# Patient Record
Sex: FEMALE | Race: BLACK OR AFRICAN AMERICAN | Employment: UNEMPLOYED | URBAN - METROPOLITAN AREA
[De-identification: names, ages, dates, MRNs, and addresses within clinical notes are randomized per-mention and may not be internally consistent; named-entity substitution may affect disease eponyms.]

---

## 2017-01-12 ENCOUNTER — HOSPITAL ENCOUNTER (OUTPATIENT)
Dept: GENERAL RADIOLOGY | Age: 42
Discharge: HOME | End: 2017-01-12
Attending: NURSE PRACTITIONER
Payer: MEDICARE

## 2017-01-12 ENCOUNTER — OFFICE VISIT (OUTPATIENT)
Dept: SURGERY | Age: 42
End: 2017-01-12

## 2017-01-12 VITALS
HEART RATE: 100 BPM | TEMPERATURE: 98.4 F | SYSTOLIC BLOOD PRESSURE: 120 MMHG | BODY MASS INDEX: 32.86 KG/M2 | WEIGHT: 222.5 LBS | RESPIRATION RATE: 20 BRPM | DIASTOLIC BLOOD PRESSURE: 80 MMHG

## 2017-01-12 DIAGNOSIS — K29.60 REFLUX GASTRITIS: ICD-10-CM

## 2017-01-12 DIAGNOSIS — E66.01 MORBID OBESITY DUE TO EXCESS CALORIES (HCC): Primary | ICD-10-CM

## 2017-01-12 DIAGNOSIS — Z98.84 LAP-BAND SURGERY STATUS: ICD-10-CM

## 2017-01-12 DIAGNOSIS — E66.01 MORBID OBESITY DUE TO EXCESS CALORIES (HCC): ICD-10-CM

## 2017-01-12 DIAGNOSIS — R13.10 DYSPHAGIA, UNSPECIFIED TYPE: ICD-10-CM

## 2017-01-12 DIAGNOSIS — G43.A0 CYCLICAL VOMITING WITHOUT NAUSEA, INTRACTABILITY OF VOMITING NOT SPECIFIED: ICD-10-CM

## 2017-01-12 DIAGNOSIS — Z46.51 ENCOUNTER FOR ADJUSTMENT OF GASTRIC LAP BAND: ICD-10-CM

## 2017-01-12 PROBLEM — Z94.0 S/P KIDNEY TRANSPLANT: Status: ACTIVE | Noted: 2017-01-12

## 2017-01-12 PROCEDURE — 74241 XR UPPER GI SERIES W KUB: CPT

## 2017-01-12 RX ORDER — HYDROXYZINE 25 MG/1
TABLET, FILM COATED ORAL
COMMUNITY

## 2017-01-12 RX ORDER — LAMOTRIGINE 25 MG/1
TABLET ORAL DAILY
COMMUNITY

## 2017-01-12 NOTE — MR AVS SNAPSHOT
Visit Information Date & Time Provider Department Dept. Phone Encounter #  
 1/12/2017  9:20 AM Henry Alvarez NP [facility] 22 [phone] [id number] Follow-up Instructions Return in about 2 weeks (around 1/26/2017). Your Appointments 1/26/2017  9:40 AM  
ESTABLISHED PATIENT with Henry Alvarez NP  
[facility] 22 [phone] ([address]) Appt Note: EP 2 week F/U $0CP $0PB  
 [address] [facility] [address]  
[address] [place] [address] Upcoming Health Maintenance Date Due HEMOGLOBIN A1C Q6M 1977 FOOT EXAM Q1 8/18/1987 MICROALBUMIN Q1 8/18/1987 EYE EXAM RETINAL OR DILATED Q1 8/18/1987 Pneumococcal 19-64 Medium Risk (1 of 1 - PPSV23) 8/18/1996 DTaP/Tdap/Td series (1 - Tdap) 8/18/1998 PAP AKA CERVICAL CYTOLOGY 8/18/1998 LIPID PANEL Q1 4/24/2010 INFLUENZA AGE 9 TO ADULT 8/1/2016 Allergies as of 1/12/2017  Review Complete On: 1/12/2017 By: Henry Alvarez NP Severity Noted Reaction Type Reactions Percocet [Oxycodone-acetaminophen] High 09/30/2010   Systemic Nausea and Vomiting Food Enzyme [Alpha-d-galactosidase] Medium 10/11/2010   Topical Itching Tomatoes itching,citrus itching Morphine Medium 09/30/2010   Topical Itching Current Immunizations  Never Reviewed No immunizations on file. Not reviewed this visit You Were Diagnosed With   
  
 Codes Comments Morbid obesity due to excess calories ([facility] [address]    -  Primary ICD-10-CM: E66.01 
ICD-9-CM: 278.01   
 LAP-BAND surgery status     ICD-10-CM: Z98.84 ICD-9-CM: V45.86 Encounter for adjustment of gastric lap band     ICD-10-CM: Z46.51 
ICD-9-CM: V53.51 Cyclical vomiting without nausea, intractability of vomiting not specified     ICD-10-CM: G43. A0 ICD-9-CM: 536.2 Reflux gastritis     ICD-10-CM: K29.60 ICD-9-CM: 535.40 Dysphagia, unspecified type     ICD-10-CM: R13.10 ICD-9-CM: 787.20 BMI 32.0-32.9,adult     ICD-10-CM: Q00.35 
ICD-9-CM: V85.32 Vitals BP Pulse Temp Resp Weight(growth percentile) LMP  
 120/80 (BP 1 Location: Right arm, BP Patient Position: Sitting) 100 98.4 °F (36.9 °C) 20 222 lb 8 oz (100.9 kg) 04/07/2013 BMI OB Status Smoking Status 32.86 kg/m2 Hysterectomy Never Smoker Vitals History BMI and BSA Data Body Mass Index Body Surface Area  
 32.86 kg/m 2 2.22 m 2 Preferred Pharmacy Pharmacy Name Phone [address] [phone] Your Updated Medication List  
  
   
This list is accurate as of: 1/12/17 12:35 PM.  Always use your most recent med list.  
  
  
  
  
 aspirin 325 mg tablet Commonly known as:  ASPIRIN Take 325 mg by mouth daily. bumetanide 0.5 mg tablet Commonly known as:  Allegra Peaches Take 2 mg by mouth daily. CELLCEPT 500 mg tablet Generic drug:  mycophenolate Take 500 mg by mouth two (2) times a day. citalopram 20 mg tablet Commonly known as:  Bolivar Peel Take  by mouth daily. FLEXERIL 10 mg tablet Generic drug:  cyclobenzaprine Take 10 mg by mouth three (3) times daily as needed. HYDROmorphone 2 mg tablet Commonly known as:  DILAUDID Take 1 Tab by mouth every four (4) hours as needed for Pain. Max Daily Amount: 12 mg.  
  
 hydrOXYzine HCl 25 mg tablet Commonly known as:  ATARAX Take  by mouth three (3) times daily as needed for Itching. KLOR-CON 10 10 mEq tablet Generic drug:  potassium chloride SR Take 20 mEq by mouth two (2) times a day. lamoTRIgine 25 mg tablet Commonly known as: LaMICtal  
Take  by mouth daily. LASIX PO Take  by mouth. LORazepam 2 mg tablet Commonly known as:  ATIVAN Take  by mouth every six (6) hours as needed. Magnesium Oxide 500 mg Cap Take  by mouth.  
  
 midodrine 2.5 mg tablet Commonly known as:  Elyn Pata Take 1 Tab by mouth three (3) times daily (with meals). PEPCID 20 mg tablet Generic drug:  famotidine Take 20 mg by mouth two (2) times a day. predniSONE 5 mg tablet Commonly known as:  Hyde Fossa Take  by mouth. PROGRAF 0.5 mg capsule Generic drug:  tacrolimus Take  by mouth two (2) times a day. SELSEB EX  
by Apply Externally route. We Performed the Following ADJUSTMENT GASTRIC BAND [ [facility]] Follow-up Instructions Return in about 2 weeks (around 2017). To-Do List   
 2017 Imaging:  XR UPPER GI SERIES W KUB   
  
 2017  1:30 PM  
  Appointment with Steffany Hernandez MD; [facility] XR OP 2 at [facility] RAD [address] [facility] ([phone]) **Exams including a Small Bowel series may take up to 4 hours. Patient needs to register 30 minutes prior to exam.  1.  Adults:  Nothing to eat or drink after midnight. 2.   to 6 months:  Nothing to eat or drink for 3 hours prior to the study. 3.  6 months to 1 year:  Nothing to eat or drink for 4 hours prior to the study. 4.  1 year to 4 years:  Nothing to eat or drink after midnight except for routine medications, if early morning study.  Otherwise, nothing to eat or drink 4 hours prior to study. 5.  5 years to 12 years:  Nothing to eat or drink after midnight except for routine medications, if early morning study.  Otherwise, nothing to eat or drink 6 hours prior to study.  6.  13 years to 18 years:  Nothing to eat or drink after midnight except for routing medications, if early morning study.  Otherwise, nothing to eat or drink 8 hours prior to study.  7.  You must bring a LIST or BAG of all current medication you are taking with you to your appointment. Patient Instructions Nausea and Vomiting: Care Instructions Your Care Instructions When you are nauseated, you may feel weak and sweaty and notice a lot of saliva in your mouth. Nausea often leads to vomiting. Most of the time you do not need to worry about nausea and vomiting, but they can be signs of other illnesses. Two common causes of nausea and vomiting are stomach flu and food poisoning. Nausea and vomiting from viral stomach flu will usually start to improve within 24 hours. Nausea and vomiting from food poisoning may last from 12 to 48 hours. The doctor has checked you carefully, but problems can develop later. If you notice any problems or new symptoms, get medical treatment right away. Follow-up care is a key part of your treatment and safety. Be sure to make and go to all appointments, and call your doctor if you are having problems. It's also a good idea to know your test results and keep a list of the medicines you take. How can you care for yourself at home? · To prevent dehydration, drink plenty of fluids, enough so that your urine is light yellow or clear like water. Choose water and other caffeine-free clear liquids until you feel better. If you have kidney, heart, or liver disease and have to limit fluids, talk with your doctor before you increase the amount of fluids you drink. · Rest in bed until you feel better. · When you are able to eat, try clear soups, mild foods, and liquids until all symptoms are gone for 12 to 48 hours. Other good choices include dry toast, crackers, cooked cereal, and gelatin dessert, such as Jell-O. When should you call for help? Call 911 anytime you think you may need emergency care. For example, call if: 
· You passed out (lost consciousness). Call your doctor now or seek immediate medical care if: 
· You have symptoms of dehydration, such as: ¨ Dry eyes and a dry mouth. ¨ Passing only a little dark urine. ¨ Feeling thirstier than usual. 
· You have new or worsening belly pain. · You have a new or higher fever. · You vomit blood or what looks like coffee grounds. Watch closely for changes in your health, and be sure to contact your doctor if: 
· You have ongoing nausea and vomiting. · Your vomiting is getting worse. · Your vomiting lasts longer than 2 days. · You are not getting better as expected. Where can you learn more? Go to http://reva-joni.info/. Enter 25 [id number] in the search box to learn more about \"Nausea and Vomiting: Care Instructions. \" Current as of: May 27, 2016 Content Version: 11.1 © 6530-0290 [employer]. Care instructions adapted under license by [employer] (which disclaims liability or warranty for this information). If you have questions about a medical condition or this instruction, always ask your healthcare professional. [address] any warranty or liability for your use of this information. Introducing [facility]! Vianey Dominguez introduces LaunchTrack patient portal. Now you can access parts of your medical record, email your doctor's office, and request medication refills online. 1. In your internet browser, go to [email or web address] 2. Click on the First Time User? Click Here link in the Sign In box. You will see the New Member Sign Up page. 3. Enter your LaunchTrack Access Code exactly as it appears below. You will not need to use this code after youve completed the sign-up process. If you do not sign up before the expiration date, you must request a new code. · LaunchTrack Access Code: [email or web address] Expires: 4/12/2017  9:44 AM 
 
4. Enter the last four digits of your Social Security Number (xxxx) and Date of Birth (mm/dd/yyyy) as indicated and click Submit. You will be taken to the next sign-up page. 5. Create a LaunchTrack ID. This will be your LaunchTrack login ID and cannot be changed, so think of one that is secure and easy to remember. 6. Create a [employer] password. You can change your password at any time. 7. Enter your Password Reset Question and Answer. This can be used at a later time if you forget your password. 8. Enter your e-mail address. You will receive e-mail notification when new information is available in [address] 9. Click Sign Up. You can now view and download portions of your medical record. 10. Click the Download Summary menu link to download a portable copy of your medical information. If you have questions, please visit the Frequently Asked Questions section of the [employer] website. Remember, [employer] is NOT to be used for urgent needs. For medical emergencies, dial 911. Now available from your iPhone and Android! Please provide this summary of care documentation to your next provider. Your primary care clinician is listed as [address]. If you have any questions after today's visit, please call [phone].

## 2017-01-12 NOTE — PROGRESS NOTES
1. Have you been to the ER, urgent care clinic since your last visit? Hospitalized since your last visit? No    2. Have you seen or consulted any other health care providers outside of the [address] [place] [address] since your last visit? Include any pap smears or colon screening.  No

## 2017-01-12 NOTE — PROGRESS NOTES
Jon Barker is a 46 y.o. female 7 years s/p lap adjustable gastric band down 55.5 pounds. Weight today is 222.5 pounds. Patient comes in office with complaint of 4 day history of dysphagia, reflux and vomiting. Stated it started after drinking apple juice a few days. She thinks she drunk it too fast. Stated she felt like it took hours for juice to go down. Stated stayed on liquid diet for a day, then start back with soups. Stated was vomiting with soup. Stated having night time reflux. Unable to keep medications down. She has anti-rejection medications that she needs status post kidney transplant. Found out yesterday can't even tolerate sips of clears. No nausea, no night-time cough, no fever, no chills, No shortness of breath, no chest pain, no abdominal pain. No issues with urination or bowel habits. HPI    Review of Systems   Constitutional: Positive for malaise/fatigue. Negative for chills and fever. Respiratory: Negative for cough, sputum production and shortness of breath. Cardiovascular: Negative for chest pain, palpitations and leg swelling. Gastrointestinal: Positive for vomiting. Negative for abdominal pain, constipation, diarrhea and nausea. Genitourinary: Negative for dysuria. Neurological: Negative for dizziness, weakness and headaches. Physical Exam   Constitutional: She is oriented to person, place, and time. She appears well-developed. She appears ill. Abdominal: Soft. Bowel sounds are normal. She exhibits no distension. There is no tenderness. There is no rebound and no guarding. Lap sites healed. Port is palpable. Musculoskeletal: Normal range of motion. Neurological: She is alert and oriented to person, place, and time. Skin: Skin is warm and dry. No erythema. Psychiatric: She has a normal mood and affect. Her behavior is normal. Thought content normal.   Blood pressure 120/80, pulse 100, temperature 98.4 °F (36.9 °C), resp.  rate 20, weight 222 lb 8 oz (100.9 kg), last menstrual period 04/07/2013. ASSESSMENT and PLAN  Morbid Obesity  7 years s/p lap adjustable gastric band down 55.5 pounds. Weight today is 222.5 pounds. Dysphagia, vomiting, reflux. Patient sent for Upper GI to evaluate lap band for slippage, pouch dilatation, etc. Upper GI results: Examination of the esophagus reveals significant stasis of barium during antegrade swallowing . There is slow antegrade passage of barium across the lower esophageal sphincter into the gastric lumen. In the semi erect position, there is more significant esophageal stasis, with regurgitation and inability to orally ingest further barium. Discussed and showed films with patient, advised she has beginning of lap band slippage with pouch dilatation, and some partial obstruction. Recommend to have all fluid removed from lap band for to see if symptoms will resolve. Emergent un-fill of lap band performed. Informed consent was obtained. Patient was placed in the standingposition. The abdomen was prepped using sterile technique. The port was then accessed with ease. 7.5 ml was removed from the band. atient has less than 1 ml of saline in band. Patient experienced dry heaves and mild regurgiation after fluid removed. She then was able to tolerate 4 ounces of water without difficulty. Patient was instructed in full liquid diet for the next 2-3 days then increase diet as tolerated. Patient to have repeat Upper GI for evaluation for resolution of early band slippage and pouch dilatation. Patient to follow up in 2 weeks. Patient was advised to notify office if experience dysphagia, nausea/vomiting, reflux despite removal of fluid from band. Patient verbalized understanding and questions were answered to the best of my knowledge and ability. Advised if any questions or concerns to call the office. 46 minutes was spent with patient.

## 2017-01-12 NOTE — PATIENT INSTRUCTIONS
Nausea and Vomiting: Care Instructions  Your Care Instructions    When you are nauseated, you may feel weak and sweaty and notice a lot of saliva in your mouth. Nausea often leads to vomiting. Most of the time you do not need to worry about nausea and vomiting, but they can be signs of other illnesses. Two common causes of nausea and vomiting are stomach flu and food poisoning. Nausea and vomiting from viral stomach flu will usually start to improve within 24 hours. Nausea and vomiting from food poisoning may last from 12 to 48 hours. The doctor has checked you carefully, but problems can develop later. If you notice any problems or new symptoms, get medical treatment right away. Follow-up care is a key part of your treatment and safety. Be sure to make and go to all appointments, and call your doctor if you are having problems. It's also a good idea to know your test results and keep a list of the medicines you take. How can you care for yourself at home? · To prevent dehydration, drink plenty of fluids, enough so that your urine is light yellow or clear like water. Choose water and other caffeine-free clear liquids until you feel better. If you have kidney, heart, or liver disease and have to limit fluids, talk with your doctor before you increase the amount of fluids you drink. · Rest in bed until you feel better. · When you are able to eat, try clear soups, mild foods, and liquids until all symptoms are gone for 12 to 48 hours. Other good choices include dry toast, crackers, cooked cereal, and gelatin dessert, such as Jell-O. When should you call for help? Call 911 anytime you think you may need emergency care. For example, call if:  · You passed out (lost consciousness). Call your doctor now or seek immediate medical care if:  · You have symptoms of dehydration, such as:  ¨ Dry eyes and a dry mouth. ¨ Passing only a little dark urine.   ¨ Feeling thirstier than usual.  · You have new or worsening belly pain. · You have a new or higher fever. · You vomit blood or what looks like coffee grounds. Watch closely for changes in your health, and be sure to contact your doctor if:  · You have ongoing nausea and vomiting. · Your vomiting is getting worse. · Your vomiting lasts longer than 2 days. · You are not getting better as expected. Where can you learn more? Go to http://reva-joni.info/. Enter 25 [id number] in the search box to learn more about \"Nausea and Vomiting: Care Instructions. \"  Current as of: May 27, 2016  Content Version: 11.1  © 6557-1328 [employer]. Care instructions adapted under license by [employer] (which disclaims liability or warranty for this information). If you have questions about a medical condition or this instruction, always ask your healthcare professional. [address] any warranty or liability for your use of this information.

## 2017-01-12 NOTE — Clinical Note
Will need second Upper GI and office appointment the same day. Please make appointment for both in 2 weeks.

## 2017-01-26 ENCOUNTER — OFFICE VISIT (OUTPATIENT)
Dept: SURGERY | Age: 42
End: 2017-01-26

## 2017-01-26 ENCOUNTER — HOSPITAL ENCOUNTER (OUTPATIENT)
Dept: GENERAL RADIOLOGY | Age: 42
Discharge: HOME | End: 2017-01-26
Attending: NURSE PRACTITIONER
Payer: MEDICARE

## 2017-01-26 VITALS
RESPIRATION RATE: 20 BRPM | TEMPERATURE: 97.6 F | DIASTOLIC BLOOD PRESSURE: 70 MMHG | WEIGHT: 218 LBS | HEART RATE: 96 BPM | SYSTOLIC BLOOD PRESSURE: 116 MMHG | HEIGHT: 69 IN | BODY MASS INDEX: 32.29 KG/M2

## 2017-01-26 DIAGNOSIS — R13.10 DYSPHAGIA, UNSPECIFIED TYPE: ICD-10-CM

## 2017-01-26 DIAGNOSIS — G43.A0 CYCLICAL VOMITING WITHOUT NAUSEA, INTRACTABILITY OF VOMITING NOT SPECIFIED: ICD-10-CM

## 2017-01-26 DIAGNOSIS — Z98.84 LAP-BAND SURGERY STATUS: ICD-10-CM

## 2017-01-26 DIAGNOSIS — K21.00 REFLUX ESOPHAGITIS: ICD-10-CM

## 2017-01-26 DIAGNOSIS — E66.01 MORBID OBESITY DUE TO EXCESS CALORIES (HCC): ICD-10-CM

## 2017-01-26 DIAGNOSIS — R10.13 DYSPEPSIA: ICD-10-CM

## 2017-01-26 DIAGNOSIS — K29.60 REFLUX GASTRITIS: ICD-10-CM

## 2017-01-26 DIAGNOSIS — E66.01 MORBID OBESITY DUE TO EXCESS CALORIES (HCC): Primary | ICD-10-CM

## 2017-01-26 PROCEDURE — 74241 XR UPPER GI SERIES W KUB: CPT

## 2017-01-26 NOTE — PROGRESS NOTES
Cristofer Blackman is a 46 y.o. female 7 years s/p lap gastric band down 60 pounds. Weight today is 218 pounds. Patient comes in office status post Upper GI evaluation of lap band. Patient was seen in office 2 weeks ago with dysphagia, reflux and vomiting. Patient had lap band emptied due to findings of early lap band slippage, partial obstruction, and pouch dilatation on Upper GI. Patient stated that she is feeling better and able to get much needed transplant medications down without an issues. Yet she is still having intermittent reflux. No nausea/vomiting, no heartburn, no night-time cough, no fever, no chills, No shortness of breath, no chest pain, no abdominal pain. Denies dysphagia. Stated having portion control Tolerating all textured foods. Drinking water. Denies eating/drinking together. No issues with urination or bowel habits. HPI    Review of Systems   Constitutional: Negative for chills, fever and malaise/fatigue. Respiratory: Negative for cough, sputum production and shortness of breath. Cardiovascular: Negative for chest pain, palpitations and leg swelling. Gastrointestinal: Negative for abdominal pain, blood in stool, constipation, diarrhea, heartburn, nausea and vomiting. Intermittent reflux   Genitourinary: Negative for dysuria. Neurological: Negative for dizziness. Physical Exam   Constitutional: She is oriented to person, place, and time. She appears well-developed and well-nourished. No distress. Abdominal: Soft. Bowel sounds are normal. She exhibits no distension. There is no tenderness. There is no rebound and no guarding. Lap sites healed. Port is palpable. Musculoskeletal: Normal range of motion. Neurological: She is alert and oriented to person, place, and time. Skin: Skin is warm and dry. No rash noted. No erythema. Psychiatric: She has a normal mood and affect.  Her behavior is normal. Thought content normal.   Blood pressure 116/70, pulse 96, temperature 97.6 °F (36.4 °C), resp. rate 20, height 5' 9\" (1.753 m), weight 218 lb (98.9 kg), last menstrual period 04/07/2013. ASSESSMENT and PLAN  Morbid Obesity 7 years s/p lap gastric band down 60 pounds. Weight today is 218 pounds    Upper GI today with significant improvement of pouch dilatation, no obstruction, and appropriate placement of lap band. Discussed results with patient. Yet due to continued reflux recommend patient to not to have adjustment of lap band until reflux symptoms have completely resolved. Placed patient on Nexium once daily for 1-2 weeks. Patient to follow up by phone in 1-2 weeks. Advised if symptoms continue will need further evaluation with endoscopy. Patient to continue with behaviors of measure 4 ounces with meals, no eating/drinking together, chewing foods well, and avoid lying down right after eating. Patient verbalized understanding and questions were answered to the best of my knowledge and ability. Advised if any questions or concerns to call the office.

## 2017-01-26 NOTE — PROGRESS NOTES
1. Have you been to the ER, urgent care clinic since your last visit? Hospitalized since your last visit? No    2. Have you seen or consulted any other health care providers outside of the [address] [place] [address] since your last visit? Include any pap smears or colon screening.  No

## 2017-01-26 NOTE — PATIENT INSTRUCTIONS
Indigestion (Dyspepsia or Heartburn): Care Instructions  Your Care Instructions  Sometimes it can be hard to pinpoint the cause of indigestion (dyspepsia or heartburn). Most cases of an upset stomach with bloating, burning, burping, and nausea are minor and go away within several hours. Home treatment and over-the-counter medicine often are able to control symptoms. But if you take medicine to relieve your indigestion without making diet and lifestyle changes, your symptoms are likely to return again and again. If you get indigestion often, it may be a sign of a more serious medical problem. Be sure to follow up with your doctor, who may want to do tests to be sure of the cause of your indigestion. Follow-up care is a key part of your treatment and safety. Be sure to make and go to all appointments, and call your doctor if you are having problems. Its also a good idea to know your test results and keep a list of the medicines you take. How can you care for yourself at home? · Your doctor may recommend over-the-counter medicine. For mild or occasional indigestion, antacids such as Tums, Gaviscon, Mylanta, or Maalox may help. Your doctor also may recommend over-the-counter acid reducers, such as Pepcid AC, Tagamet HB, Zantac 75, or Prilosec. Read and follow all instructions on the label. If you use these medicines often, talk with your doctor. · Change your eating habits. ¨ Its best to eat several small meals instead of two or three large meals. ¨ After you eat, wait 2 to 3 hours before you lie down. ¨ Chocolate, mint, and alcohol can make GERD worse. ¨ Spicy foods, foods that have a lot of acid (like tomatoes and oranges), and coffee can make GERD symptoms worse in some people. If your symptoms are worse after you eat a certain food, you may want to stop eating that food to see if your symptoms get better. · Do not smoke or chew tobacco. Smoking can make GERD worse.  If you need help quitting, talk to your doctor about stop-smoking programs and medicines. These can increase your chances of quitting for good. · If you have GERD symptoms at night, raise the head of your bed 6 to 8 inches by putting the frame on blocks or placing a foam wedge under the head of your mattress. (Adding extra pillows does not work.)  · Do not wear tight clothing around your middle. · Lose weight if you need to. Losing just 5 to 10 pounds can help. · Do not take anti-inflammatory medicines, such as aspirin, ibuprofen (Advil, Motrin), or naproxen (Aleve). These can irritate the stomach. If you need a pain medicine, try acetaminophen (Tylenol), which does not cause stomach upset. When should you call for help? Call 911 anytime you think you may need emergency care. For example, call if:  · You passed out (lost consciousness). · You vomit blood or what looks like coffee grounds. · You pass maroon or very bloody stools. · You have chest pain or pressure. This may occur with:  ¨ Sweating. ¨ Shortness of breath. ¨ Nausea or vomiting. ¨ Pain that spreads from the chest to the neck, jaw, or one or both shoulders or arms. ¨ Feeling dizzy or lightheaded. ¨ A fast or uneven pulse. After calling 911, chew 1 adult-strength aspirin. Wait for an ambulance. Do not try to drive yourself. Call your doctor now or seek immediate medical care if:  · You have severe belly pain. · Your stools are black and tarlike or have streaks of blood. · You have trouble swallowing. · You are losing weight and do not know why. Watch closely for changes in your health, and be sure to contact your doctor if:  · You do not get better as expected. Where can you learn more? Go to http://reva-joni.info/. Enter U433 in the search box to learn more about \"Indigestion (Dyspepsia or Heartburn): Care Instructions. \"  Current as of: August 9, 2016  Content Version: 11.1  © 1359-5977 [email or web address], [employer].  Care instructions adapted under license by [address] (which disclaims liability or warranty for this information). If you have questions about a medical condition or this instruction, always ask your healthcare professional. [address] any warranty or liability for your use of this information.

## 2017-01-26 NOTE — MR AVS SNAPSHOT
Visit Information Date & Time Provider Department Dept. Phone Encounter #  
 1/26/2017  9:40 AM Driss Allison NP [facility] [phone] [id number] Follow-up Instructions Return in about 2 weeks (around 2/9/2017). Upcoming Health Maintenance Date Due HEMOGLOBIN A1C Q6M 1977 FOOT EXAM Q1 8/18/1987 MICROALBUMIN Q1 8/18/1987 EYE EXAM RETINAL OR DILATED Q1 8/18/1987 Pneumococcal 19-64 Medium Risk (1 of 1 - PPSV23) 8/18/1996 DTaP/Tdap/Td series (1 - Tdap) 8/18/1998 PAP AKA CERVICAL CYTOLOGY 8/18/1998 LIPID PANEL Q1 4/24/2010 INFLUENZA AGE 9 TO ADULT 8/1/2016 Allergies as of 1/26/2017  Review Complete On: 1/26/2017 By: Driss Allison NP Severity Noted Reaction Type Reactions Percocet [Oxycodone-acetaminophen] High 09/30/2010   Systemic Nausea and Vomiting Food Enzyme [Alpha-d-galactosidase] Medium 10/11/2010   Topical Itching Tomatoes itching,citrus itching Morphine Medium 09/30/2010   Topical Itching Current Immunizations  Never Reviewed No immunizations on file. Not reviewed this visit You Were Diagnosed With   
  
 Codes Comments Morbid obesity due to excess calories ([facility] [address]    -  Primary ICD-10-CM: E66.01 
ICD-9-CM: 278.01   
 LAP-BAND surgery status     ICD-10-CM: Z98.84 ICD-9-CM: V45.86 Reflux esophagitis     ICD-10-CM: K21.0 ICD-9-CM: 530.11 Dyspepsia     ICD-10-CM: R10.13 ICD-9-CM: 536.8 BMI 32.0-32.9,adult     ICD-10-CM: R82.51 
ICD-9-CM: V85.32 Vitals BP Pulse Temp Resp Height(growth percentile) Weight(growth percentile) 116/70 (BP 1 Location: Right arm, BP Patient Position: Sitting) 96 97.6 °F (36.4 °C) 20 5' 9\" (1.753 m) 218 lb (98.9 kg) LMP BMI OB Status Smoking Status 04/07/2013 32.19 kg/m2 Hysterectomy Never Smoker Vitals History BMI and BSA Data  Body Mass Index Body Surface Area  
 32.19 kg/m 2 2.19 m 2  
  
  
 Preferred Pharmacy Pharmacy Name Phone [address] [phone] Your Updated Medication List  
  
   
This list is accurate as of: 1/26/17  1:07 PM.  Always use your most recent med list.  
  
  
  
  
 aspirin 325 mg tablet Commonly known as:  ASPIRIN Take 325 mg by mouth daily. bumetanide 0.5 mg tablet Commonly known as:  Denita Bolus Take 2 mg by mouth daily. CELLCEPT 500 mg tablet Generic drug:  mycophenolate Take 500 mg by mouth two (2) times a day. citalopram 20 mg tablet Commonly known as:  Tempie Josh Take  by mouth daily. FLEXERIL 10 mg tablet Generic drug:  cyclobenzaprine Take 10 mg by mouth three (3) times daily as needed. HYDROmorphone 2 mg tablet Commonly known as:  DILAUDID Take 1 Tab by mouth every four (4) hours as needed for Pain. Max Daily Amount: 12 mg.  
  
 hydrOXYzine HCl 25 mg tablet Commonly known as:  ATARAX Take  by mouth three (3) times daily as needed for Itching. KLOR-CON 10 10 mEq tablet Generic drug:  potassium chloride SR Take 20 mEq by mouth two (2) times a day. lamoTRIgine 25 mg tablet Commonly known as: LaMICtal  
Take  by mouth daily. LASIX PO Take  by mouth. LORazepam 2 mg tablet Commonly known as:  ATIVAN Take  by mouth every six (6) hours as needed. Magnesium Oxide 500 mg Cap Take  by mouth.  
  
 midodrine 2.5 mg tablet Commonly known as:  Maria Alejandra Senthil Take 1 Tab by mouth three (3) times daily (with meals). PEPCID 20 mg tablet Generic drug:  famotidine Take 20 mg by mouth two (2) times a day. predniSONE 5 mg tablet Commonly known as:  Amparo Beam Take  by mouth. PROGRAF 0.5 mg capsule Generic drug:  tacrolimus Take  by mouth two (2) times a day. SELSEB EX  
by Apply Externally route. Follow-up Instructions Return in about 2 weeks (around 2/9/2017). Patient Instructions Indigestion (Dyspepsia or Heartburn): Care Instructions Your Care Instructions Sometimes it can be hard to pinpoint the cause of indigestion (dyspepsia or heartburn). Most cases of an upset stomach with bloating, burning, burping, and nausea are minor and go away within several hours. Home treatment and over-the-counter medicine often are able to control symptoms. But if you take medicine to relieve your indigestion without making diet and lifestyle changes, your symptoms are likely to return again and again. If you get indigestion often, it may be a sign of a more serious medical problem. Be sure to follow up with your doctor, who may want to do tests to be sure of the cause of your indigestion. Follow-up care is a key part of your treatment and safety. Be sure to make and go to all appointments, and call your doctor if you are having problems. Its also a good idea to know your test results and keep a list of the medicines you take. How can you care for yourself at home? · Your doctor may recommend over-the-counter medicine. For mild or occasional indigestion, antacids such as Tums, Gaviscon, Mylanta, or Maalox may help. Your doctor also may recommend over-the-counter acid reducers, such as Pepcid AC, Tagamet HB, Zantac 75, or Prilosec. Read and follow all instructions on the label. If you use these medicines often, talk with your doctor. · Change your eating habits. ¨ Its best to eat several small meals instead of two or three large meals. ¨ After you eat, wait 2 to 3 hours before you lie down. ¨ Chocolate, mint, and alcohol can make GERD worse. ¨ Spicy foods, foods that have a lot of acid (like tomatoes and oranges), and coffee can make GERD symptoms worse in some people. If your symptoms are worse after you eat a certain food, you may want to stop eating that food to see if your symptoms get better. · Do not smoke or chew tobacco. Smoking can make GERD worse. If you need help quitting, talk to your doctor about stop-smoking programs and medicines. These can increase your chances of quitting for good. · If you have GERD symptoms at night, raise the head of your bed 6 to 8 inches by putting the frame on blocks or placing a foam wedge under the head of your mattress. (Adding extra pillows does not work.) · Do not wear tight clothing around your middle. · Lose weight if you need to. Losing just 5 to 10 pounds can help. · Do not take anti-inflammatory medicines, such as aspirin, ibuprofen (Advil, Motrin), or naproxen (Aleve). These can irritate the stomach. If you need a pain medicine, try acetaminophen (Tylenol), which does not cause stomach upset. When should you call for help? Call 911 anytime you think you may need emergency care. For example, call if: 
· You passed out (lost consciousness). · You vomit blood or what looks like coffee grounds. · You pass maroon or very bloody stools. · You have chest pain or pressure. This may occur with: ¨ Sweating. ¨ Shortness of breath. ¨ Nausea or vomiting. ¨ Pain that spreads from the chest to the neck, jaw, or one or both shoulders or arms. ¨ Feeling dizzy or lightheaded. ¨ A fast or uneven pulse. After calling 911, chew 1 adult-strength aspirin. Wait for an ambulance. Do not try to drive yourself. Call your doctor now or seek immediate medical care if: 
· You have severe belly pain. · Your stools are black and tarlike or have streaks of blood. · You have trouble swallowing. · You are losing weight and do not know why. Watch closely for changes in your health, and be sure to contact your doctor if: 
· You do not get better as expected. Where can you learn more? Go to http://reva-joni.info/. Enter Y453 in the search box to learn more about \"Indigestion (Dyspepsia or Heartburn): Care Instructions. \" 
 Current as of: August 9, 2016 Content Version: 11.1 © 6378-6150 [employer], [employer]. Care instructions adapted under license by [employer] (which disclaims liability or warranty for this information). If you have questions about a medical condition or this instruction, always ask your healthcare professional. [address] any warranty or liability for your use of this information. Introducing [facility] & HEALTH SERVICES! Laly Grewal introduces [employer] patient portal. Now you can access parts of your medical record, email your doctor's office, and request medication refills online. 1. In your internet browser, go to [email or web address] 2. Click on the First Time User? Click Here link in the Sign In box. You will see the New Member Sign Up page. 3. Enter your [employer] Access Code exactly as it appears below. You will not need to use this code after youve completed the sign-up process. If you do not sign up before the expiration date, you must request a new code. · [employer] Access Code: [email or web address] Expires: 4/12/2017  9:44 AM 
 
4. Enter the last four digits of your Social Security Number (xxxx) and Date of Birth (mm/dd/yyyy) as indicated and click Submit. You will be taken to the next sign-up page. 5. Create a [employer] ID. This will be your [employer] login ID and cannot be changed, so think of one that is secure and easy to remember. 6. Create a [employer] password. You can change your password at any time. 7. Enter your Password Reset Question and Answer. This can be used at a later time if you forget your password. 8. Enter your e-mail address. You will receive e-mail notification when new information is available in [address] 9. Click Sign Up. You can now view and download portions of your medical record. 10. Click the Download Summary menu link to download a portable copy of your medical information. If you have questions, please visit the Frequently Asked Questions section of the [employer] website. Remember, [employer] is NOT to be used for urgent needs. For medical emergencies, dial 911. Now available from your iPhone and Android! Please provide this summary of care documentation to your next provider. Your primary care clinician is listed as [address]. If you have any questions after today's visit, please call [phone].

## 2017-01-31 ENCOUNTER — TELEPHONE (OUTPATIENT)
Dept: SURGERY | Age: 42
End: 2017-01-31

## 2017-01-31 NOTE — TELEPHONE ENCOUNTER
Telephone call with patient. Patient stated feeling much better and wants to start putting fluid back in lap band. Denies dysphagia, no nausea, and no vomiting. Patient to come in office next week.

## 2017-02-08 ENCOUNTER — OFFICE VISIT (OUTPATIENT)
Dept: SURGERY | Age: 42
End: 2017-02-08

## 2017-02-08 VITALS
OXYGEN SATURATION: 99 % | HEIGHT: 69 IN | WEIGHT: 221 LBS | RESPIRATION RATE: 16 BRPM | TEMPERATURE: 98.1 F | SYSTOLIC BLOOD PRESSURE: 108 MMHG | HEART RATE: 70 BPM | DIASTOLIC BLOOD PRESSURE: 78 MMHG | BODY MASS INDEX: 32.73 KG/M2

## 2017-02-08 DIAGNOSIS — Z46.51 ENCOUNTER FOR FITTING AND ADJUSTMENT OF GASTRIC LAP BAND: ICD-10-CM

## 2017-02-08 DIAGNOSIS — E66.01 MORBID OBESITY DUE TO EXCESS CALORIES (HCC): Primary | ICD-10-CM

## 2017-02-08 DIAGNOSIS — Z98.84 LAP-BAND SURGERY STATUS: ICD-10-CM

## 2017-02-08 NOTE — PROGRESS NOTES
Chauncey León is a 46 y.o. female 7 years status post lap gastric banding, down 57 pounds. Weight today is 221 pounds. Patient comes in office today for adjustment of lap band. Stated that she is doing much better. No longer having any reflux or using nay medication for it. No nausea/vomiting, no heartburn/reflux, no night-time cough, no fever, no chills, No shortness of breath, no chest pain, no abdominal pain. Denies dysphagia. Measuring meals. Drinking at least 40 ounces of water daily. Exercising with walking. Bowel movements daily that are formed. HPI    Review of Systems   Constitutional: Negative. Negative for chills, fever and malaise/fatigue. Respiratory: Negative for cough, sputum production and shortness of breath. Cardiovascular: Negative for chest pain, palpitations and leg swelling. Gastrointestinal: Negative for abdominal pain, blood in stool, constipation, diarrhea, heartburn, nausea and vomiting. Genitourinary: Negative for dysuria. Neurological: Negative for dizziness. Physical Exam   Constitutional: She is oriented to person, place, and time. She appears well-developed and well-nourished. No distress. Abdominal: Soft. Bowel sounds are normal. She exhibits no distension. There is no tenderness. There is no rebound and no guarding. Lap sites healed. Port is palpable. Musculoskeletal: Normal range of motion. Neurological: She is alert and oriented to person, place, and time. Skin: Skin is warm and dry. No rash noted. No erythema. Psychiatric: She has a normal mood and affect. Her behavior is normal. Thought content normal.   Blood pressure 108/78, pulse 70, temperature 98.1 °F (36.7 °C), temperature source Oral, resp. rate 16, height 5' 9\" (1.753 m), weight 221 lb (100.2 kg), last menstrual period 04/07/2013, SpO2 99 %. ASSESSMENT and PLAN  Morbid Obesity 7 years status post lap gastric banding, down 57 pounds.  Weight today is 221 pounds  With verbal consent an office adjustment was performed today. Patient was placed in standing position. Abdomen was prepped using sterile technique, Pérez needle easily accessed band. A total of 1 ml of normal saline was found in band, an additional 4 ml was added to band. Patient has 5 ml of normal saline in band. Patient then tolerated 6-8 oz fluid bolus with productive belch. Advised to stay on full diet for 12-24 hours, then advance diet as tolerated. If developed nausea, vomiting, dysphagia, heartburn, reflux, and/or nighttime cough advised to call office for possible removal of some fluid out of band. Yet if tolerating diet patient to follow up in office in 3-4 weeks. Advised patient to continue with diet that is high-protein, low-fat, low-sugar, and measuring 4 ounces at each meal. Also continue hydrating with at least  40 ounces of no-calorie, non-carbonated beverages. Advised to call office if any questions/concerns.

## 2017-02-08 NOTE — MR AVS SNAPSHOT
Visit Information Date & Time Provider Department Dept. Phone Encounter #  
 2/8/2017 11:20 AM Patricia aGlan NP [place] [phone] [id number] Upcoming Health Maintenance Date Due HEMOGLOBIN A1C Q6M 1977 FOOT EXAM Q1 8/18/1987 MICROALBUMIN Q1 8/18/1987 EYE EXAM RETINAL OR DILATED Q1 8/18/1987 Pneumococcal 19-64 Medium Risk (1 of 1 - PPSV23) 8/18/1996 DTaP/Tdap/Td series (1 - Tdap) 8/18/1998 PAP AKA CERVICAL CYTOLOGY 8/18/1998 LIPID PANEL Q1 4/24/2010 INFLUENZA AGE 9 TO ADULT 8/1/2016 Allergies as of 2/8/2017  Review Complete On: 2/8/2017 By: Patricia Galan NP Severity Noted Reaction Type Reactions Percocet [Oxycodone-acetaminophen] High 09/30/2010   Systemic Nausea and Vomiting Food Enzyme [Alpha-d-galactosidase] Medium 10/11/2010   Topical Itching Tomatoes itching,citrus itching Morphine Medium 09/30/2010   Topical Itching Current Immunizations  Never Reviewed No immunizations on file. Not reviewed this visit You Were Diagnosed With   
  
 Codes Comments Morbid obesity due to excess calories ([facility] [address]    -  Primary ICD-10-CM: E66.01 
ICD-9-CM: 278.01   
 LAP-BAND surgery status     ICD-10-CM: Z98.84 ICD-9-CM: V45.86 Encounter for fitting and adjustment of gastric lap band     ICD-10-CM: Z46.51 
ICD-9-CM: V53.51 BMI 32.0-32.9,adult     ICD-10-CM: W96.65 
ICD-9-CM: V85.32 Vitals BP Pulse Temp Resp Height(growth percentile) Weight(growth percentile) 108/78 (BP 1 Location: Right arm, BP Patient Position: Sitting) 70 98.1 °F (36.7 °C) (Oral) 16 5' 9\" (1.753 m) 221 lb (100.2 kg) LMP SpO2 BMI OB Status Smoking Status 04/07/2013 99% 32.64 kg/m2 Hysterectomy Never Smoker BMI and BSA Data Body Mass Index Body Surface Area  
 32.64 kg/m 2 2.21 m 2 Preferred Pharmacy Pharmacy Name Phone [address]  [phone] Your Updated Medication List  
  
   
This list is accurate as of: 2/8/17 12:14 PM.  Always use your most recent med list.  
  
  
  
  
 aspirin 325 mg tablet Commonly known as:  ASPIRIN Take 325 mg by mouth daily. bumetanide 0.5 mg tablet Commonly known as:  Jorgito David Take 2 mg by mouth daily. CELLCEPT 500 mg tablet Generic drug:  mycophenolate Take 500 mg by mouth two (2) times a day. citalopram 20 mg tablet Commonly known as:  Bolaños Busing Take  by mouth daily. FLEXERIL 10 mg tablet Generic drug:  cyclobenzaprine Take 10 mg by mouth three (3) times daily as needed. HYDROmorphone 2 mg tablet Commonly known as:  DILAUDID Take 1 Tab by mouth every four (4) hours as needed for Pain. Max Daily Amount: 12 mg.  
  
 hydrOXYzine HCl 25 mg tablet Commonly known as:  ATARAX Take  by mouth three (3) times daily as needed for Itching. KLOR-CON 10 10 mEq tablet Generic drug:  potassium chloride SR Take 20 mEq by mouth two (2) times a day. lamoTRIgine 25 mg tablet Commonly known as: LaMICtal  
Take  by mouth daily. LASIX PO Take  by mouth. LORazepam 2 mg tablet Commonly known as:  ATIVAN Take  by mouth every six (6) hours as needed. Magnesium Oxide 500 mg Cap Take  by mouth.  
  
 midodrine 2.5 mg tablet Commonly known as:  Zeinab Angelo Take 1 Tab by mouth three (3) times daily (with meals). PEPCID 20 mg tablet Generic drug:  famotidine Take 20 mg by mouth two (2) times a day. predniSONE 5 mg tablet Commonly known as:  Merle Jason Take  by mouth. PROGRAF 0.5 mg capsule Generic drug:  tacrolimus Take  by mouth two (2) times a day. SELSEB EX  
by Apply Externally route. We Performed the Following ADJUSTMENT GASTRIC BAND [ [facility]] Patient Instructions [facility] 
[phone] Post Lap Band Adjustment Instructions Your band is now more restrictive. Some swelling can occur in the band following a fill. Therefore, you should eat : 
 
Full liquids for 12-24 hours Soft & mushy foods for 2 days Resume regular food on the 4th day. All meals should fit in a 4 ounce cup. (1/2 cup container) Choose foods that require chewing, ideally foods rich in fiber & protein. Avoid fatty & sugary foods. Avoid snack food items. Eating tips: 
 
Put down your fork between bites. Do not talk and eat at the same time. No drinking 30 minutes before or one hour after a meal. 
 
 Call for an evaluation if you develop new reflux (heartburn), a night time cough or inability to tolerate solids foods. Your next regular follow-up appointment is in: 4 to 6 weeks. Please call the office at [phone] to schedule this appointment. If you have any problems before your scheduled appointment, don't wait. Call the office when you are having the problem. They may need to see you before your scheduled appointment. Introducing [facility]! Cecilia Rodriguez introduces [employer] patient portal. Now you can access parts of your medical record, email your doctor's office, and request medication refills online. 1. In your internet browser, go to [email or web address] 2. Click on the First Time User? Click Here link in the Sign In box. You will see the New Member Sign Up page. 3. Enter your [employer] Access Code exactly as it appears below. You will not need to use this code after youve completed the sign-up process. If you do not sign up before the expiration date, you must request a new code. · [employer] Access Code: [email or web address] Expires: 4/12/2017  9:44 AM 
 
4.  Enter the last four digits of your Social Security Number (xxxx) and Date of Birth (mm/dd/yyyy) as indicated and click Submit. You will be taken to the next sign-up page. 5. Create a [employer] ID. This will be your [employer] login ID and cannot be changed, so think of one that is secure and easy to remember. 6. Create a [employer] password. You can change your password at any time. 7. Enter your Password Reset Question and Answer. This can be used at a later time if you forget your password. 8. Enter your e-mail address. You will receive e-mail notification when new information is available in [address] 9. Click Sign Up. You can now view and download portions of your medical record. 10. Click the Download Summary menu link to download a portable copy of your medical information. If you have questions, please visit the Frequently Asked Questions section of the [employer] website. Remember, [employer] is NOT to be used for urgent needs. For medical emergencies, dial 911. Now available from your iPhone and Android! Please provide this summary of care documentation to your next provider. Your primary care clinician is listed as [address]. If you have any questions after today's visit, please call [phone].

## 2017-02-08 NOTE — PATIENT INSTRUCTIONS
[facility] at [facility]  [phone]      Post Lap Band Adjustment Instructions    Your band is now more restrictive. Some swelling can occur in the band following a fill. Therefore, you should eat :    Full liquids for 12-24 hours  Soft & mushy foods for 2 days  Resume regular food on the 4th day. All meals should fit in a 4 ounce cup. (1/2 cup container)   Choose foods that require chewing, ideally foods rich in fiber & protein. Avoid fatty & sugary foods. Avoid snack food items. Eating tips:    Put down your fork between bites. Do not talk and eat at the same time. No drinking 30 minutes before or one hour after a meal.     Call for an evaluation if you develop new reflux (heartburn), a night time cough or inability to tolerate solids foods. Your next regular follow-up appointment is in: 4 to 6 weeks. Please call the office at [phone] to schedule this appointment. If you have any problems before your scheduled appointment, don't wait. Call the office when you are having the problem. They may need to see you before your scheduled appointment.

## 2017-02-08 NOTE — PROGRESS NOTES
1. Have you been to the ER, urgent care clinic since your last visit? Hospitalized since your last visit? No    2. Have you seen or consulted any other health care providers outside of the [facility] since your last visit? Include any pap smears or colon screening.  No

## 2017-03-30 ENCOUNTER — OFFICE VISIT (OUTPATIENT)
Dept: SURGERY | Age: 42
End: 2017-03-30

## 2017-03-30 VITALS
DIASTOLIC BLOOD PRESSURE: 80 MMHG | BODY MASS INDEX: 33.4 KG/M2 | HEIGHT: 69 IN | SYSTOLIC BLOOD PRESSURE: 110 MMHG | TEMPERATURE: 98.6 F | WEIGHT: 225.5 LBS | RESPIRATION RATE: 16 BRPM

## 2017-03-30 DIAGNOSIS — Z46.51 ENCOUNTER FOR ADJUSTMENT OF GASTRIC LAP BAND: ICD-10-CM

## 2017-03-30 DIAGNOSIS — Z98.84 LAP-BAND SURGERY STATUS: ICD-10-CM

## 2017-03-30 DIAGNOSIS — E66.01 MORBID OBESITY DUE TO EXCESS CALORIES (HCC): Primary | ICD-10-CM

## 2017-03-30 NOTE — PATIENT INSTRUCTIONS
[id number] [facility] at [facility]  [phone]      Post Lap Band Adjustment Instructions    Your band is now more restrictive. Some swelling can occur in the band following a fill. Therefore, you should eat :    Full liquids for 12-24 hours  Soft & mushy foods for 2 days  Resume regular food on the 4th day. All meals should fit in a 4 ounce cup. (1/2 cup container)   Choose foods that require chewing, ideally foods rich in fiber & protein. Avoid fatty & sugary foods. Avoid snack food items. Eating tips:    Put down your fork between bites. Do not talk and eat at the same time. No drinking 30 minutes before or one hour after a meal.     Call for an evaluation if you develop new reflux (heartburn), a night time cough or inability to tolerate solids foods. Your next regular follow-up appointment is in: 4 to 6 weeks. Please call the office at [phone] to schedule this appointment. If you have any problems before your scheduled appointment, don't wait. Call the office when you are having the problem.  They may need to see you before your scheduled appointment

## 2017-03-30 NOTE — PROGRESS NOTES
1. Have you been to the ER, urgent care clinic since your last visit? Hospitalized since your last visit? No    2. Have you seen or consulted any other health care providers outside of the [address] [place] [address] since your last visit? Include any pap smears or colon screening.  No

## 2017-03-30 NOTE — PROGRESS NOTES
Marques Linton is a 46 y.o. female 7 years status post lap gastric banding, down  52.5 pounds. Weight today is 225 pounds. Patient comes in office today for adjustment of lap band because having hunger and portions are larger. Patient stated she has been snacking more and eating more at meal times. Stated hasn't been measuring meals because having hunger. No nausea/vomiting, no heartburn/reflux, no night-time cough, no fever, no chills, No shortness of breath, no chest pain, no abdominal pain. Denies dysphagia. Drinking at least 40 ounces of water daily. Denies eating/drinking together. Exercising by walking. HPI    Review of Systems   Constitutional: Negative for chills, fever and malaise/fatigue. Respiratory: Negative for cough, sputum production and shortness of breath. Cardiovascular: Negative for chest pain, palpitations and leg swelling. Gastrointestinal: Negative for abdominal pain, blood in stool, constipation, diarrhea, heartburn, nausea and vomiting. Neurological: Negative for dizziness. Physical Exam   Constitutional: She is oriented to person, place, and time. She appears well-developed and well-nourished. Abdominal: Soft. Bowel sounds are normal. She exhibits no distension. There is no tenderness. There is no rebound and no guarding. Lap sites healed. Port is palpable. Neurological: She is alert and oriented to person, place, and time. Skin: Skin is warm and dry. No rash noted. No erythema. Psychiatric: She has a normal mood and affect. Her behavior is normal. Thought content normal.   Blood pressure 110/80, temperature 98.6 °F (37 °C), temperature source Oral, resp. rate 16, height 5' 9\" (1.753 m), weight 225 lb 8 oz (102.3 kg), last menstrual period 04/07/2013. ASSESSMENT and PLAN  Morbid Obesity 7 years status post lap gastric banding, down  52.5 pounds. Weight today is 225 pounds. With verbal consent an office adjustment was performed today.  Patient was placed in standing position. Abdomen was prepped using sterile technique, Pérez needle easily accessed band. A total of 5ml of normal saline is suppose to be in lap band, an additional 2 ml was added to band. Patient has 7  ml of normal saline in band. Patient then tolerated 6-8 oz fluid bolus with productive belch. Advised to stay on full diet for 12-24 hours, then advance diet as tolerated. If developed nausea, vomiting, dysphagia, heartburn, reflux, and/or nighttime cough advised to call office for possible removal of some fluid out of band. Yet if tolerating diet patient to follow up in office in 6 to 8 weeks. Advised patient to continue with diet that is high-protein, low-fat, low-sugar, and measuring 4 ounces at each meal. Also continue hydrating with at least  40 ounces of no-calorie, non-carbonated beverages. Advised to call office if any questions/concerns.     21 minutes was spent with patient

## 2017-03-30 NOTE — MR AVS SNAPSHOT
Visit Information Date & Time Provider Department Dept. Phone Encounter #  
 3/30/2017 11:00 AM Melissa Lawson NP [place] [address] [phone] [id number] Follow-up Instructions Return in about 8 weeks (around 5/25/2017). Upcoming Health Maintenance Date Due HEMOGLOBIN A1C Q6M 1977 FOOT EXAM Q1 8/18/1987 MICROALBUMIN Q1 8/18/1987 EYE EXAM RETINAL OR DILATED Q1 8/18/1987 Pneumococcal 19-64 Medium Risk (1 of 1 - PPSV23) 8/18/1996 DTaP/Tdap/Td series (1 - Tdap) 8/18/1998 PAP AKA CERVICAL CYTOLOGY 8/18/1998 LIPID PANEL Q1 4/24/2010 INFLUENZA AGE 9 TO ADULT 8/1/2016 Allergies as of 3/30/2017  Review Complete On: 3/30/2017 By: Melissa Lawson NP Severity Noted Reaction Type Reactions Percocet [Oxycodone-acetaminophen] High 09/30/2010   Systemic Nausea and Vomiting Food Enzyme [Alpha-d-galactosidase] Medium 10/11/2010   Topical Itching Tomatoes itching,citrus itching Morphine Medium 09/30/2010   Topical Itching Current Immunizations  Never Reviewed No immunizations on file. Not reviewed this visit You Were Diagnosed With   
  
 Codes Comments Morbid obesity due to excess calories ([facility] [address]    -  Primary ICD-10-CM: E66.01 
ICD-9-CM: 278.01   
 LAP-BAND surgery status     ICD-10-CM: Z98.84 ICD-9-CM: V45.86 Encounter for adjustment of gastric lap band     ICD-10-CM: Z46.51 
ICD-9-CM: V53.51 BMI 33.0-33.9,adult     ICD-10-CM: H74.97 
ICD-9-CM: V85.33 Vitals BP Temp Resp Height(growth percentile) Weight(growth percentile) LMP  
 110/80 (BP 1 Location: Left arm, BP Patient Position: Sitting) 98.6 °F (37 °C) (Oral) 16 5' 9\" (1.753 m) 225 lb 8 oz (102.3 kg) 04/07/2013 BMI OB Status Smoking Status 33.3 kg/m2 Hysterectomy Never Smoker BMI and BSA Data Body Mass Index Body Surface Area  
 33.3 kg/m 2 2.23 m 2 Preferred Pharmacy Pharmacy Name Phone [address]  [phone] Your Updated Medication List  
  
   
This list is accurate as of: 3/30/17 12:16 PM.  Always use your most recent med list.  
  
  
  
  
 aspirin 325 mg tablet Commonly known as:  ASPIRIN Take 325 mg by mouth daily. bumetanide 0.5 mg tablet Commonly known as:  Carmen Harms Take 2 mg by mouth daily. CELLCEPT 500 mg tablet Generic drug:  mycophenolate Take 500 mg by mouth two (2) times a day. citalopram 20 mg tablet Commonly known as:  Robi Dame Take  by mouth daily. FLEXERIL 10 mg tablet Generic drug:  cyclobenzaprine Take 10 mg by mouth three (3) times daily as needed. HYDROmorphone 2 mg tablet Commonly known as:  DILAUDID Take 1 Tab by mouth every four (4) hours as needed for Pain. Max Daily Amount: 12 mg.  
  
 hydrOXYzine HCl 25 mg tablet Commonly known as:  ATARAX Take  by mouth three (3) times daily as needed for Itching. KLOR-CON 10 10 mEq tablet Generic drug:  potassium chloride SR Take 20 mEq by mouth two (2) times a day. lamoTRIgine 25 mg tablet Commonly known as: LaMICtal  
Take  by mouth daily. LASIX PO Take  by mouth. LORazepam 2 mg tablet Commonly known as:  ATIVAN Take  by mouth every six (6) hours as needed. Magnesium Oxide 500 mg Cap Take  by mouth.  
  
 midodrine 2.5 mg tablet Commonly known as:  Jacobo Croon Take 1 Tab by mouth three (3) times daily (with meals). PEPCID 20 mg tablet Generic drug:  famotidine Take 20 mg by mouth two (2) times a day. predniSONE 5 mg tablet Commonly known as:  Elaine Roers Take  by mouth. PROGRAF 0.5 mg capsule Generic drug:  tacrolimus Take  by mouth two (2) times a day. SELSEB EX  
by Apply Externally route. We Performed the Following ADJUSTMENT GASTRIC BAND [ [facility]] Follow-up Instructions Return in about 8 weeks (around 5/25/2017). [facility] 
[phone] Post Lap Band Adjustment Instructions Your band is now more restrictive. Some swelling can occur in the band following a fill. Therefore, you should eat : 
 
Full liquids for 12-24 hours Soft & mushy foods for 2 days Resume regular food on the 4th day. All meals should fit in a 4 ounce cup. (1/2 cup container) Choose foods that require chewing, ideally foods rich in fiber & protein. Avoid fatty & sugary foods. Avoid snack food items. Eating tips: 
 
Put down your fork between bites. Do not talk and eat at the same time. No drinking 30 minutes before or one hour after a meal. 
 
 Call for an evaluation if you develop new reflux (heartburn), a night time cough or inability to tolerate solids foods. Your next regular follow-up appointment is in: 4 to 6 weeks. Please call the office at [phone] to schedule this appointment. If you have any problems before your scheduled appointment, don't wait. Call the office when you are having the problem. They may need to see you before your scheduled appointment Introducing [facility] & HEALTH SERVICES! Starr Carranza introduces [employer] patient portal. Now you can access parts of your medical record, email your doctor's office, and request medication refills online. 1. In your internet browser, go to [email or web address] 2. Click on the First Time User? Click Here link in the Sign In box. You will see the New Member Sign Up page. 3. Enter your [employer] Access Code exactly as it appears below. You will not need to use this code after youve completed the sign-up process. If you do not sign up before the expiration date, you must request a new code. · [employer] Access Code: [email or web address] Expires: 4/12/2017 10:44 AM 
 
 4. Enter the last four digits of your Social Security Number (xxxx) and Date of Birth (mm/dd/yyyy) as indicated and click Submit. You will be taken to the next sign-up page. 5. Create a [employer] ID. This will be your [employer] login ID and cannot be changed, so think of one that is secure and easy to remember. 6. Create a [employer] password. You can change your password at any time. 7. Enter your Password Reset Question and Answer. This can be used at a later time if you forget your password. 8. Enter your e-mail address. You will receive e-mail notification when new information is available in [address] 9. Click Sign Up. You can now view and download portions of your medical record. 10. Click the Download Summary menu link to download a portable copy of your medical information. If you have questions, please visit the Frequently Asked Questions section of the [employer] website. Remember, [employer] is NOT to be used for urgent needs. For medical emergencies, dial 911. Now available from your iPhone and Android! Please provide this summary of care documentation to your next provider. Your primary care clinician is listed as [address]. If you have any questions after today's visit, please call [phone].

## 2018-03-29 ENCOUNTER — OFFICE VISIT (OUTPATIENT)
Dept: SURGERY | Age: 43
End: 2018-03-29

## 2018-03-29 VITALS
SYSTOLIC BLOOD PRESSURE: 120 MMHG | TEMPERATURE: 98.5 F | OXYGEN SATURATION: 95 % | HEART RATE: 79 BPM | HEIGHT: 69 IN | DIASTOLIC BLOOD PRESSURE: 80 MMHG | WEIGHT: 224.5 LBS | RESPIRATION RATE: 20 BRPM | BODY MASS INDEX: 33.25 KG/M2

## 2018-03-29 DIAGNOSIS — E66.01 MORBID OBESITY (HCC): Primary | ICD-10-CM

## 2018-03-29 DIAGNOSIS — Z98.84 LAP-BAND SURGERY STATUS: ICD-10-CM

## 2018-03-29 DIAGNOSIS — Z46.51 ENCOUNTER FOR ADJUSTMENT OF GASTRIC LAP BAND: ICD-10-CM

## 2018-03-29 NOTE — PROGRESS NOTES
Gemma Pérez is a 38 y.o. female 8 year status post lap gastric banding, down 53.5 pounds. Weight today is 224.5 pounds. Patient stated doing well. Patient comes in office today for request of adjustment of lap band due to hunger. No nausea/vomiting, no heartburn/reflux, no night-time cough, no fever, no chills, No shortness of breath, no chest pain, no abdominal pain. Denies dysphagia. Measuring meals, 4 ounces, yet portions are larger. Stated has been doing more plant based diet, yet has added seafood for protein. Snacking minimal with fruit. Drinking at least 40 ounces of water daily. Exercising by walking. Bowel movements daily that are formed. HPI    Review of Systems   Constitutional: Negative for chills, fever and malaise/fatigue. Respiratory: Negative for cough, sputum production and shortness of breath. Cardiovascular: Negative for chest pain, palpitations and leg swelling. Gastrointestinal: Negative for abdominal pain, constipation, diarrhea, heartburn, nausea and vomiting. Neurological: Negative for dizziness. Physical Exam   Constitutional: She is oriented to person, place, and time. She appears well-developed. No distress. Abdominal: Soft. Bowel sounds are normal. She exhibits no distension. There is no tenderness. There is no rebound and no guarding. Lap sites healed. Port is palpable   Neurological: She is alert and oriented to person, place, and time. Skin: Skin is warm and dry. No rash noted. No erythema. Psychiatric: She has a normal mood and affect. Her behavior is normal. Thought content normal.   Blood pressure 120/80, pulse 79, temperature 98.5 °F (36.9 °C), resp. rate 20, height 5' 9\" (1.753 m), weight 224 lb 8 oz (101.8 kg), last menstrual period 04/07/2013, SpO2 95 %. ASSESSMENT and PLAN  Morbid Obesity 8 year status post lap gastric banding, down 53.5 pounds. Weight today is 224.5 pounds    With verbal consent an office adjustment was performed today. Patient was placed in standing position. Abdomen was prepped using sterile technique, Pérez needle easily accessed band. A total of 7 ml of normal saline is suppose to be in lap band, an additional .75 ml was added to band. Patient has 7.75 ml of normal saline in band. Patient then tolerated 6-8 oz fluid bolus with productive belch. Advised to stay on full diet for 12-24 hours, then advance diet as tolerated. If developed nausea, vomiting, dysphagia, heartburn, reflux, and/or nighttime cough advised to call office for possible removal of some fluid out of band. Yet if tolerating diet patient to follow up in office in 6 to 8 weeks. Advised patient to continue with diet that is high-protein, low-fat, low-sugar, and measuring 4 ounces at each meal. Also continue hydrating with at least  40 ounces of no-calorie, non-carbonated beverages. Advised to call office if any questions/concerns.

## 2018-03-29 NOTE — PROGRESS NOTES
1. Have you been to the ER, urgent care clinic since your last visit? Hospitalized since your last visit? No    2. Have you seen or consulted any other health care providers outside of the [address] [place] [address] since your last visit? Include any pap smears or colon screening.  No

## 2018-03-29 NOTE — MR AVS SNAPSHOT
[address] [place] [address] [address] [phone] Patient: Amarjit Merida MRN: [id number] LYT:[id number] Visit Information Date & Time Provider Department Dept. Phone Encounter #  
 3/29/2018 11:20 AM Tevin Oconnell NP [place] [address] [phone] [id number] Upcoming Health Maintenance Date Due DTaP/Tdap/Td series (1 - Tdap) 8/18/1998 PAP AKA CERVICAL CYTOLOGY 8/18/1998 Influenza Age 5 to Adult 8/1/2017 MEDICARE YEARLY EXAM 3/14/2018 Allergies as of 3/29/2018  Review Complete On: 3/29/2018 By: Tevin Oconnell NP Severity Noted Reaction Type Reactions Percocet [Oxycodone-acetaminophen] High 09/30/2010   Systemic Nausea and Vomiting Food Enzyme [Alpha-d-galactosidase] Medium 10/11/2010   Topical Itching Tomatoes itching,citrus itching Morphine Medium 09/30/2010   Topical Itching Current Immunizations  Never Reviewed No immunizations on file. Not reviewed this visit You Were Diagnosed With   
  
 Codes Comments [facility]    -  Primary ICD-10-CM: E66.01 
ICD-9-CM: 278.01   
 LAP-BAND surgery status     ICD-10-CM: Z98.84 ICD-9-CM: V45.86 Encounter for adjustment of gastric lap band     ICD-10-CM: Z46.51 
ICD-9-CM: V53.51 BMI 33.0-33.9,adult     ICD-10-CM: Y89.10 
ICD-9-CM: V85.33 Vitals BP Pulse Temp Resp Height(growth percentile) Weight(growth percentile) 120/80 79 98.5 °F (36.9 °C) 20 5' 9\" (1.753 m) 224 lb 8 oz (101.8 kg) LMP SpO2 BMI OB Status Smoking Status 04/07/2013 95% 33.15 kg/m2 Hysterectomy Never Smoker BMI and BSA Data Body Mass Index Body Surface Area  
 33.15 kg/m 2 2.23 m 2 Preferred Pharmacy Pharmacy Name Phone [address] [phone] Your Updated Medication List  
  
   
 This list is accurate as of 3/29/18 11:59 AM.  Always use your most recent med list.  
  
  
  
  
 aspirin 325 mg tablet Commonly known as:  ASPIRIN Take 325 mg by mouth daily. bumetanide 0.5 mg tablet Commonly known as:  Christia Ovens Take 2 mg by mouth daily. CELLCEPT 500 mg tablet Generic drug:  mycophenolate Take 500 mg by mouth two (2) times a day. citalopram 20 mg tablet Commonly known as:  Erroll Bonier Take  by mouth daily. FLEXERIL 10 mg tablet Generic drug:  cyclobenzaprine Take 10 mg by mouth three (3) times daily as needed. HYDROmorphone 2 mg tablet Commonly known as:  DILAUDID Take 1 Tab by mouth every four (4) hours as needed for Pain. Max Daily Amount: 12 mg.  
  
 hydrOXYzine HCl 25 mg tablet Commonly known as:  ATARAX Take  by mouth three (3) times daily as needed for Itching. KLOR-CON 10 10 mEq tablet Generic drug:  potassium chloride SR Take 20 mEq by mouth two (2) times a day. lamoTRIgine 25 mg tablet Commonly known as: LaMICtal  
Take  by mouth daily. LASIX PO Take 20 mg by mouth. LORazepam 2 mg tablet Commonly known as:  ATIVAN Take  by mouth every six (6) hours as needed. Magnesium Oxide 500 mg Cap Take  by mouth.  
  
 midodrine 2.5 mg tablet Commonly known as:  Priscilla Bourdon Take 1 Tab by mouth three (3) times daily (with meals). PEPCID 20 mg tablet Generic drug:  famotidine Take 20 mg by mouth two (2) times a day. predniSONE 5 mg tablet Commonly known as:  Raynette Celeste Take  by mouth. PROGRAF 0.5 mg capsule Generic drug:  tacrolimus Take  by mouth two (2) times a day. SELSEB EX  
by Apply Externally route. WELLBUTRIN PO Take  by mouth. We Performed the Following ADJUSTMENT GASTRIC BAND [ [facility]] Introducing [facility]!    
 [employer] introduces [employer] patient portal. Now you can access parts of your medical record, email your doctor's office, and request medication refills online. 1. In your internet browser, go to [email or web address] 2. Click on the First Time User? Click Here link in the Sign In box. You will see the New Member Sign Up page. 3. Enter your [employer] Access Code exactly as it appears below. You will not need to use this code after youve completed the sign-up process. If you do not sign up before the expiration date, you must request a new code. · [employer] Access Code: [email or web address] Expires: 6/27/2018 11:02 AM 
 
4. Enter the last four digits of your Social Security Number (xxxx) and Date of Birth (mm/dd/yyyy) as indicated and click Submit. You will be taken to the next sign-up page. 5. Create a [employer] ID. This will be your [employer] login ID and cannot be changed, so think of one that is secure and easy to remember. 6. Create a [employer] password. You can change your password at any time. 7. Enter your Password Reset Question and Answer. This can be used at a later time if you forget your password. 8. Enter your e-mail address. You will receive e-mail notification when new information is available in [address] 9. Click Sign Up. You can now view and download portions of your medical record. 10. Click the Download Summary menu link to download a portable copy of your medical information. If you have questions, please visit the Frequently Asked Questions section of the [employer] website. Remember, [employer] is NOT to be used for urgent needs. For medical emergencies, dial 911. Now available from your iPhone and Android! Please provide this summary of care documentation to your next provider. Your primary care clinician is listed as [address]. If you have any questions after today's visit, please call [phone].

## 2018-04-16 ENCOUNTER — OFFICE VISIT (OUTPATIENT)
Dept: SURGERY | Age: 43
End: 2018-04-16

## 2018-04-16 VITALS
WEIGHT: 220.8 LBS | TEMPERATURE: 98.6 F | SYSTOLIC BLOOD PRESSURE: 122 MMHG | OXYGEN SATURATION: 97 % | RESPIRATION RATE: 18 BRPM | BODY MASS INDEX: 32.7 KG/M2 | HEIGHT: 69 IN | DIASTOLIC BLOOD PRESSURE: 78 MMHG | HEART RATE: 75 BPM

## 2018-04-16 DIAGNOSIS — K21.9 GASTROESOPHAGEAL REFLUX DISEASE, ESOPHAGITIS PRESENCE NOT SPECIFIED: ICD-10-CM

## 2018-04-16 DIAGNOSIS — Z98.84 LAP-BAND SURGERY STATUS: ICD-10-CM

## 2018-04-16 DIAGNOSIS — R11.10 VOMITING, INTRACTABILITY OF VOMITING NOT SPECIFIED, PRESENCE OF NAUSEA NOT SPECIFIED, UNSPECIFIED VOMITING TYPE: ICD-10-CM

## 2018-04-16 DIAGNOSIS — Z46.51 FITTING AND ADJUSTMENT OF GASTRIC LAP BAND: ICD-10-CM

## 2018-04-16 DIAGNOSIS — E66.01 MORBID OBESITY (HCC): Primary | ICD-10-CM

## 2018-04-16 NOTE — MR AVS SNAPSHOT
[address] [facility] [address] [phone] Patient: Liliana Gonzalez MRN: [id number] WBV:[id number]/12/0603 Visit Information Date & Time Provider Department Dept. Phone Encounter #  
 4/16/2018  9:20 AM Sergo Taedo NP [place] [id number] [phone] [id number] Upcoming Health Maintenance Date Due DTaP/Tdap/Td series (1 - Tdap) 8/18/1998 PAP AKA CERVICAL CYTOLOGY 8/18/1998 Influenza Age 5 to Adult 8/1/2017 MEDICARE YEARLY EXAM 3/14/2018 Allergies as of 4/16/2018  Review Complete On: 4/16/2018 By: Sergo Tadeo NP Severity Noted Reaction Type Reactions Percocet [Oxycodone-acetaminophen] High 09/30/2010   Systemic Nausea and Vomiting Food Enzyme [Alpha-d-galactosidase] Medium 10/11/2010   Topical Itching Tomatoes itching,citrus itching Morphine Medium 09/30/2010   Topical Itching Current Immunizations  Never Reviewed No immunizations on file. Not reviewed this visit You Were Diagnosed With   
  
 Codes Comments Morbid obesity ([facility] [address]    -  Primary ICD-10-CM: E66.01 
ICD-9-CM: 278.01 Fitting and adjustment of gastric lap band     ICD-10-CM: Z46.51 
ICD-9-CM: V53.51 Vomiting, intractability of vomiting not specified, presence of nausea not specified, unspecified vomiting type     ICD-10-CM: R11.10 ICD-9-CM: 787.03 Gastroesophageal reflux disease, esophagitis presence not specified     ICD-10-CM: K21.9 ICD-9-CM: 530.81 LAP-BAND surgery status     ICD-10-CM: Z98.84 ICD-9-CM: V45.86 BMI 32.0-32.9,adult     ICD-10-CM: A88.26 
ICD-9-CM: V85.32 Vitals BP Pulse Temp Resp Height(growth percentile) Weight(growth percentile) 122/78 (BP 1 Location: Right arm, BP Patient Position: Sitting) 75 98.6 °F (37 °C) (Oral) 18 5' 9\" (1.753 m) 220 lb 12.8 oz (100.2 kg) LMP SpO2 BMI OB Status Smoking Status 04/07/2013 97% 32.61 kg/m2 Hysterectomy Never Smoker Vitals History BMI and BSA Data Body Mass Index Body Surface Area  
 32.61 kg/m 2 2.21 m 2 Preferred Pharmacy Pharmacy Name Phone [facility] [phone] Your Updated Medication List  
  
   
This list is accurate as of 4/16/18 10:02 AM.  Always use your most recent med list.  
  
  
  
  
 aspirin 325 mg tablet Commonly known as:  ASPIRIN Take 325 mg by mouth daily. bumetanide 0.5 mg tablet Commonly known as:  Simona Carry Take 2 mg by mouth daily. CELLCEPT 500 mg tablet Generic drug:  mycophenolate Take 500 mg by mouth two (2) times a day. citalopram 20 mg tablet Commonly known as:  Corena Cassandra Take  by mouth daily. FLEXERIL 10 mg tablet Generic drug:  cyclobenzaprine Take 10 mg by mouth three (3) times daily as needed. HYDROmorphone 2 mg tablet Commonly known as:  DILAUDID Take 1 Tab by mouth every four (4) hours as needed for Pain. Max Daily Amount: 12 mg.  
  
 hydrOXYzine HCl 25 mg tablet Commonly known as:  ATARAX Take  by mouth three (3) times daily as needed for Itching. KLOR-CON 10 10 mEq tablet Generic drug:  potassium chloride SR Take 20 mEq by mouth two (2) times a day. lamoTRIgine 25 mg tablet Commonly known as: LaMICtal  
Take  by mouth daily. LASIX PO Take 20 mg by mouth. LORazepam 2 mg tablet Commonly known as:  ATIVAN Take  by mouth every six (6) hours as needed. Magnesium Oxide 500 mg Cap Take  by mouth.  
  
 midodrine 2.5 mg tablet Commonly known as:  Meet Perks Take 1 Tab by mouth three (3) times daily (with meals). PEPCID 20 mg tablet Generic drug:  famotidine Take 20 mg by mouth two (2) times a day. predniSONE 5 mg tablet Commonly known as:  Ana Maria Ada Take  by mouth. PROGRAF 0.5 mg capsule Generic drug:  tacrolimus Take  by mouth two (2) times a day. SELSEB EX  
by Apply Externally route. WELLBUTRIN PO Take  by mouth. We Performed the Following ADJUSTMENT GASTRIC BAND [ [facility]] Patient Instructions Oral Rehydration: Care Instructions Your Care Instructions Dehydration occurs when your body loses too much water. This can happen if you do not drink enough fluids or lose a lot of fluid due to diarrhea, vomiting, or sweating. Being dehydrated can cause health problems and can even be life-threatening. To replace lost fluids, you need to drink liquid that contains special chemicals called electrolytes. Electrolytes keep your body working well. Plain water does not have electrolytes. You also need to rest to prevent more fluid loss. Replacing water and electrolytes (oral rehydration) completely takes about 36 hours. But you should feel better within a few hours. Follow-up care is a key part of your treatment and safety. Be sure to make and go to all appointments, and call your doctor if you are having problems. It's also a good idea to know your test results and keep a list of the medicines you take. How can you care for yourself at home? · Take frequent sips of a drink such as Gatorade, Powerade, or other rehydration drinks that your doctor suggests. These replace both fluid and important chemicals (electrolytes) you need for balance in your blood. · Drink 2 quarts of cool liquid over 2 to 4 hours. You should have at least 10 glasses of liquid a day to replace lost fluid. If you have kidney, heart, or liver disease and have to limit fluids, talk with your doctor before you increase the amount of fluids you drink. · Make your own drink. Measure everything carefully. The drink may not work well or may even be harmful if the amounts are off. ¨ 1 quart water ¨ ½ teaspoon salt ¨ 6 teaspoons sugar · Do not drink liquid with caffeine, such as coffee and venkat. · Do not drink any alcohol. It can make you dehydrated. · Drink plenty of fluids, enough so that your urine is light yellow or clear like water. If you have kidney, heart, or liver disease and have to limit fluids, talk with your doctor before you increase the amount of fluids you drink. When should you call for help? Call 911 anytime you think you may need emergency care. For example, call if: 
? · You have signs of severe dehydration, such as: 
¨ You are confused or unable to stay awake. ¨ You passed out (lost consciousness). ?Call your doctor now or seek immediate medical care if: 
? · You still have signs of dehydration. You have sunken eyes and a dry mouth, and you pass only a little dark urine. ? · You are dizzy or lightheaded, or you feel like you may faint. ? · You are not able to keep down fluids. ? Watch closely for changes in your health, and be sure to contact your doctor if: 
? · You do not get better as expected. Where can you learn more? Go to http://reva-joni.info/. Enter I040 in the search box to learn more about \"Oral Rehydration: Care Instructions. \" Current as of: March 20, 2017 Content Version: 11.4 © 1878-5344 [employer]. Care instructions adapted under license by [employer] (which disclaims liability or warranty for this information). If you have questions about a medical condition or this instruction, always ask your healthcare professional. [place] [address] any warranty or liability for your use of this information. Introducing [facility]! [facility] introduces [employer] patient portal. Now you can access parts of your medical record, email your doctor's office, and request medication refills online. 1. In your internet browser, go to [email or web address] 2. Click on the First Time User? Click Here link in the Sign In box. You will see the New Member Sign Up page. 3. Enter your 7fgame Access Code exactly as it appears below. You will not need to use this code after youve completed the sign-up process. If you do not sign up before the expiration date, you must request a new code. · 7fgame Access Code: [email or web address] Expires: 6/27/2018 11:02 AM 
 
4. Enter the last four digits of your Social Security Number (xxxx) and Date of Birth (mm/dd/yyyy) as indicated and click Submit. You will be taken to the next sign-up page. 5. Create a 7fgame ID. This will be your 7fgame login ID and cannot be changed, so think of one that is secure and easy to remember. 6. Create a 7fgame password. You can change your password at any time. 7. Enter your Password Reset Question and Answer. This can be used at a later time if you forget your password. 8. Enter your e-mail address. You will receive e-mail notification when new information is available in [address] 9. Click Sign Up. You can now view and download portions of your medical record. 10. Click the Download Summary menu link to download a portable copy of your medical information. If you have questions, please visit the Frequently Asked Questions section of the 7fgame website. Remember, 7fgame is NOT to be used for urgent needs. For medical emergencies, dial 911. Now available from your iPhone and Android! Please provide this summary of care documentation to your next provider. Your primary care clinician is listed as [address]. If you have any questions after today's visit, please call [phone].

## 2018-04-16 NOTE — PROGRESS NOTES
Subjective:   Ana Guillen is a 38 y.o. female with previous lap band surgery, who is here today needing lap band adjustment because of vomiting and Gerd. She states that she ate fish and cheesey vegetables on Friday and has been vomiting every since. She is barely able to tolerate water. Objective:     Visit Vitals    /78 (BP 1 Location: Right arm, BP Patient Position: Sitting)    Pulse 75    Temp 98.6 °F (37 °C) (Oral)    Resp 18    Ht 5' 9\" (1.753 m)    Wt 220 lb 12.8 oz (100.2 kg)    LMP 04/07/2013    SpO2 97%    BMI 32.61 kg/m2      Estimated body mass index is 32.61 kg/(m^2) as calculated from the following:    Height as of this encounter: 5' 9\" (1.753 m). Weight as of this encounter: 220 lb 12.8 oz (100.2 kg). Wt Readings from Last 3 Encounters:   04/16/18 220 lb 12.8 oz (100.2 kg)   03/29/18 224 lb 8 oz (101.8 kg)   03/30/17 225 lb 8 oz (102.3 kg)     Her change in weight since last visit: lost,  4 lbs. Assessment/Plan: Morbid Obesity, status post lap-band surgery, needing fill adjustment  Reviewed behaviors for band compliance. Lap Band FiIl Procedure  Informed consent was obtained. Patient was placed in the standing position. The abdomen was prepped using sterile technique. The port was then accessed with ease. 7.75 ml was documented in the band. Removed 0.5 ml from band. Total in band 7.25 ml. Patient tolerated the procedure well and without difficulty. Patient then tolerated at least 6 ounces of water without difficulty. Patient was instructed in full liquid diet for next 48 hours, soft foods for 48 hours and then advance as tolerated. Patient to follow up in 6weeks. Patient was advised to notify office if experience dysphagia, nausea/vomiting, reflux. Pt verbalized understanding and questions were answered to the best of my knowledge and ability.

## 2018-04-16 NOTE — PATIENT INSTRUCTIONS
Oral Rehydration: Care Instructions  Your Care Instructions    Dehydration occurs when your body loses too much water. This can happen if you do not drink enough fluids or lose a lot of fluid due to diarrhea, vomiting, or sweating. Being dehydrated can cause health problems and can even be life-threatening. To replace lost fluids, you need to drink liquid that contains special chemicals called electrolytes. Electrolytes keep your body working well. Plain water does not have electrolytes. You also need to rest to prevent more fluid loss. Replacing water and electrolytes (oral rehydration) completely takes about 36 hours. But you should feel better within a few hours. Follow-up care is a key part of your treatment and safety. Be sure to make and go to all appointments, and call your doctor if you are having problems. It's also a good idea to know your test results and keep a list of the medicines you take. How can you care for yourself at home? · Take frequent sips of a drink such as Gatorade, Powerade, or other rehydration drinks that your doctor suggests. These replace both fluid and important chemicals (electrolytes) you need for balance in your blood. · Drink 2 quarts of cool liquid over 2 to 4 hours. You should have at least 10 glasses of liquid a day to replace lost fluid. If you have kidney, heart, or liver disease and have to limit fluids, talk with your doctor before you increase the amount of fluids you drink. · Make your own drink. Measure everything carefully. The drink may not work well or may even be harmful if the amounts are off. ¨ 1 quart water  ¨ ½ teaspoon salt  ¨ 6 teaspoons sugar  · Do not drink liquid with caffeine, such as coffee and venkat. · Do not drink any alcohol. It can make you dehydrated. · Drink plenty of fluids, enough so that your urine is light yellow or clear like water.  If you have kidney, heart, or liver disease and have to limit fluids, talk with your doctor before you increase the amount of fluids you drink. When should you call for help? Call 911 anytime you think you may need emergency care. For example, call if:  ? · You have signs of severe dehydration, such as:  ¨ You are confused or unable to stay awake. ¨ You passed out (lost consciousness). ?Call your doctor now or seek immediate medical care if:  ? · You still have signs of dehydration. You have sunken eyes and a dry mouth, and you pass only a little dark urine. ? · You are dizzy or lightheaded, or you feel like you may faint. ? · You are not able to keep down fluids. ? Watch closely for changes in your health, and be sure to contact your doctor if:  ? · You do not get better as expected. Where can you learn more? Go to http://reva-joni.info/. Enter I040 in the search box to learn more about \"Oral Rehydration: Care Instructions. \"  Current as of: March 20, 2017  Content Version: 11.4  © 2939-8999 [employer]. Care instructions adapted under license by [facility] (which disclaims liability or warranty for this information). If you have questions about a medical condition or this instruction, always ask your healthcare professional. [address] any warranty or liability for your use of this information.

## 2021-11-26 ENCOUNTER — HOSPITAL ENCOUNTER (EMERGENCY)
Age: 46
Discharge: HOME | End: 2021-11-26
Attending: EMERGENCY MEDICINE
Payer: COMMERCIAL

## 2021-11-26 VITALS
HEART RATE: 82 BPM | DIASTOLIC BLOOD PRESSURE: 73 MMHG | OXYGEN SATURATION: 96 % | RESPIRATION RATE: 16 BRPM | BODY MASS INDEX: 28.38 KG/M2 | TEMPERATURE: 97.9 F | WEIGHT: 191.58 LBS | HEIGHT: 69 IN | SYSTOLIC BLOOD PRESSURE: 98 MMHG

## 2021-11-26 DIAGNOSIS — N30.01 ACUTE CYSTITIS WITH HEMATURIA: ICD-10-CM

## 2021-11-26 DIAGNOSIS — N18.6 ESRD NEEDING DIALYSIS (HCC): ICD-10-CM

## 2021-11-26 DIAGNOSIS — R31.0 GROSS HEMATURIA: Primary | ICD-10-CM

## 2021-11-26 DIAGNOSIS — Z99.2 ESRD NEEDING DIALYSIS (HCC): ICD-10-CM

## 2021-11-26 LAB
APPEARANCE UR: ABNORMAL
BACTERIA URNS QL MICRO: ABNORMAL /HPF
BASE EXCESS BLD CALC-SCNC: 1.4 MMOL/L
BILIRUB UR QL CFM: NEGATIVE
CA-I BLD-MCNC: 1.08 MMOL/L (ref 1.12–1.32)
CHLORIDE BLD-SCNC: 103 MMOL/L (ref 100–108)
CO2 BLD-SCNC: 28 MMOL/L (ref 19–24)
COLOR UR: ABNORMAL
CREAT UR-MCNC: >15 MG/DL (ref 0.6–1.3)
EPITH CASTS URNS QL MICRO: ABNORMAL /LPF
GLUCOSE BLD STRIP.AUTO-MCNC: 94 MG/DL (ref 74–106)
GLUCOSE UR STRIP.AUTO-MCNC: 100 MG/DL
HCO3 BLDA-SCNC: 28 MMOL/L
HGB UR QL STRIP: ABNORMAL
KETONES UR QL STRIP.AUTO: NEGATIVE MG/DL
LACTATE BLD-SCNC: 0.54 MMOL/L (ref 0.4–2)
LEUKOCYTE ESTERASE UR QL STRIP.AUTO: NEGATIVE
NITRITE UR QL STRIP.AUTO: NEGATIVE
OTHER,OTHU: ABNORMAL
PCO2 BLDV: 48.5 MMHG (ref 41–51)
PH BLDV: 7.36 [PH] (ref 7.32–7.42)
PH UR STRIP: 7.5 [PH] (ref 5–8)
PO2 BLDV: 22 MMHG (ref 25–40)
POTASSIUM BLD-SCNC: 4.4 MMOL/L (ref 3.5–5.5)
PROT UR STRIP-MCNC: >300 MG/DL
RBC #/AREA URNS HPF: ABNORMAL /HPF (ref 0–5)
SODIUM BLD-SCNC: 139 MMOL/L (ref 136–145)
SP GR UR REFRACTOMETRY: 1.02 (ref 1–1.03)
SPECIMEN SITE: ABNORMAL
UA: UC IF INDICATED,UAUC: ABNORMAL
UROBILINOGEN UR QL STRIP.AUTO: 0.2 EU/DL (ref 0.2–1)
WBC URNS QL MICRO: ABNORMAL /HPF (ref 0–4)

## 2021-11-26 PROCEDURE — 99284 EMERGENCY DEPT VISIT MOD MDM: CPT

## 2021-11-26 PROCEDURE — 82803 BLOOD GASES ANY COMBINATION: CPT

## 2021-11-26 PROCEDURE — 87086 URINE CULTURE/COLONY COUNT: CPT

## 2021-11-26 PROCEDURE — 81001 URINALYSIS AUTO W/SCOPE: CPT

## 2021-11-26 RX ORDER — CEPHALEXIN 500 MG/1
500 CAPSULE ORAL 3 TIMES DAILY
Qty: 21 CAPSULE | Refills: 0 | Status: SHIPPED | OUTPATIENT
Start: 2021-11-26 | End: 2021-12-03

## 2021-11-26 RX ORDER — FLUCONAZOLE 150 MG/1
150 TABLET ORAL
Qty: 2 TABLET | Refills: 0 | Status: SHIPPED | OUTPATIENT
Start: 2021-11-26 | End: 2021-11-26

## 2021-11-26 NOTE — DISCHARGE INSTRUCTIONS
It was a pleasure taking care of you in our Emergency Department today. We know that when you come to [facility], you are entrusting us with your health, comfort, and safety. Our physicians and nurses honor that trust, and truly appreciate the opportunity to care for you and your loved ones. We also value your feedback. If you receive a survey about your Emergency Department experience today, please fill it out. We care about our patients' feedback, and we listen to what you have to say. Please read over your discharge instructions as these contain pertinent information to help you in the healing process. These instructions include a list of prescriptions you were given today. Follow-up information is also noted on your discharge papers. There are attached instructions and information pertaining to the reason why you were seen in the emergency department today. These discharge instructions may not be for exactly why you were here, but may be the closest available instructions that we have. These include important advice for things that you can do at home to feel better, and reasons to return to the emergency department. The evaluation and treatment you received in the emergency department is not always definitive care. If follow-up with your primary care doctor or specialist was recommended, it is important that you make these appointments for follow-up care. You may need further testing, procedures, and/or medications to help you feel better. Further tests may be required that are not available in the emergency department. Failure to make these follow-up appointments may jeopardize your health. The emergency department is here for emergent stabilization and evaluation of life and limb threatening illness and/or injuries.   Further care through a specialist or primary care doctor may be required to assist in your healing and complete your treatment and/or evaluation. We may not always be able to make a diagnosis in the emergency department, or things may change that will alter your diagnosis. Our primary goal is to ensure that nothing serious is occurring and that you are stable to continue your treatment and evaluation at home as an outpatient. Of course, if things change, and you feel worse, you are always encouraged to return to the emergency department for re-evaluation.     Lab Results Today:  Recent Results (from the past 8 hour(s))   BLOOD GAS,CHEM8,LACTIC ACID POC    Collection Time: 11/26/21  7:57 AM   Result Value Ref Range    Calcium, ionized (POC) 1.08 (L) 1.12 - 1.32 mmol/L    BICARBONATE 28 mmol/L    Base excess (POC) 1.4 mmol/L    Sample source VENOUS BLOOD      CO2, POC 28 (H) 19 - 24 MMOL/L    Sodium,  136 - 145 MMOL/L    Potassium, POC 4.4 3.5 - 5.5 MMOL/L    Chloride,  100 - 108 MMOL/L    Glucose, POC 94 74 - 106 MG/DL    Creatinine, POC >15.0 (H) 0.6 - 1.3 MG/DL    Lactic Acid (POC) 0.54 0.40 - 2.00 mmol/L    pH, venous (POC) 7.36 7.32 - 7.42      pCO2, venous (POC) 48.5 41 - 51 MMHG    pO2, venous (POC) 22 (L) 25 - 40 mmHg   URINALYSIS W/ REFLEX CULTURE    Collection Time: 11/26/21  8:07 AM    Specimen: Miscellaneous sample; Urine    Urine specimen   Result Value Ref Range    Color YELLOW/STRAW      Appearance CLOUDY (A) CLEAR      Specific gravity 1.020 1.003 - 1.030      pH (UA) 7.5 5.0 - 8.0      Protein >300 (A) NEG mg/dL    Glucose 100 (A) NEG mg/dL    Ketone Negative NEG mg/dL    Blood LARGE (A) NEG      Urobilinogen 0.2 0.2 - 1.0 EU/dL    Nitrites Negative NEG      Leukocyte Esterase Negative NEG      WBC 10-20 0 - 4 /hpf    RBC 20-50 0 - 5 /hpf    Epithelial cells FEW FEW /lpf    Bacteria 1+ (A) NEG /hpf    UA:UC IF INDICATED URINE CULTURE ORDERED (A) CNI      Other: Renal Epithelial cells Present     BILIRUBIN, CONFIRM    Collection Time: 11/26/21  8:07 AM   Result Value Ref Range    Bilirubin UA, confirm Negative NEG Radiology Results Today:  No results found.

## 2021-11-26 NOTE — ED NOTES
Patient arrives to ED room from triage with a cc of blood in urine. Pt is a dialysis patient (MWF) and did not receive dialysis this am d/t the facility being too far of a drive. Pt recently moved to [place]. Pt has put in a transfer for a different facility but has not heard back. 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 Pt states she has been peeing blood when she is able to urinate. Dialysis fistula is on L upper arm, patent. Pt is alert and oriented x 4, resting comfortably in stretcher with side rails up and call bell within reach. Family member at bedside.

## 2021-11-26 NOTE — ED PROVIDER NOTES
EMERGENCY DEPARTMENT HISTORY AND PHYSICAL EXAM      Date: 2021  Patient Name: Lacey Mckeon    History of Presenting Illness     Chief Complaint   Patient presents with    Other     Pt arrives ambulatory to triage with CC of blood in urine x 2 days as well as needing dialysis. Patient denies pain with urination but reports slight abd cramping. She is a kidney transplant patient and reports failure of transplant in April landing her back in dialysis. She states that she recently moved and is now 45 minutes away from her dialysis center. M, W, F patient. History Provided By: Patient    HPI: Lacey Mckeon, 42 y.o. female  presents to the ED with cc of hematuria and need for dialysis. Patient is dialyzed on a  schedule. She recently moved and is now 40 minutes away from her usual dialysis center. She did place a request for transfer 4 to 5 weeks ago but has not been transferred to a new center. Today she also presents for gross hematuria. Noticed blood in her urine in the past 2 days. She has a history of failed kidney transplant and recently just started producing some urine but has not had blood in her urine for a long time. She denies any fevers or chills. No nausea or vomiting. She is not short of breath.     Past History     Past Medical History:  Past Medical History:   Diagnosis Date    Anemia 2010    Chronic kidney disease     Diabetes in pregnancy 2010    H/O kidney transplant     Hemodialysis     LAP-BAND surgery status 2017    Morbid obesity ([facility] [address] 2010    Pancreatitis     Renal disease 2010    S/P kidney transplant 2017    Unspecified adverse effect of anesthesia     epidural wore off       Past Surgical History:  Past Surgical History:   Procedure Laterality Date    DELIVERY       ERCP      HX CHOLECYSTECTOMY      and bile duct repair    HX GYN      2 vaginal births, 1 c section    HX OTHER SURGICAL      bowel duct repair    HX RENAL BIOPSY      HX TRANSPLANT  8/2014    kidney transplant    HX VASCULAR ACCESS  3/2008    perma cath    IR INSERT CANNULA FOR HEMODIALYS AV      NE BREAST SURGERY PROCEDURE UNLISTED  ? date    reduction    NE LAP, PLACE ADJUST GASTR BAND  5/5/09    NE LAP,CHOLECYSTECTOMY      XR FLUROSCOPY UP TO 1 HR ROUTINE  12/15/09       Medications:  No current facility-administered medications on file prior to encounter. Current Outpatient Medications on File Prior to Encounter   Medication Sig Dispense Refill    BUPROPION HCL (WELLBUTRIN PO) Take  by mouth.  lamoTRIgine (LAMICTAL) 25 mg tablet Take  by mouth daily.  hydrOXYzine HCl (ATARAX) 25 mg tablet Take  by mouth three (3) times daily as needed for Itching.  citalopram (CELEXA) 20 mg tablet Take  by mouth daily.  FUROSEMIDE (LASIX PO) Take 20 mg by mouth.  HYDROmorphone (DILAUDID) 2 mg tablet Take 1 Tab by mouth every four (4) hours as needed for Pain. Max Daily Amount: 12 mg. 15 Tab 0    SELENIUM SULFIDE (SELSEB EX) by Apply Externally route.  predniSONE (DELTASONE) 5 mg tablet Take  by mouth.  tacrolimus (PROGRAF) 0.5 mg capsule Take  by mouth two (2) times a day.  Magnesium Oxide 500 mg cap Take  by mouth.  potassium chloride SR (KLOR-CON 10) 10 mEq tablet Take 20 mEq by mouth two (2) times a day.  famotidine (PEPCID) 20 mg tablet Take 20 mg by mouth two (2) times a day.  aspirin (ASPIRIN) 325 mg tablet Take 325 mg by mouth daily.  bumetanide (BUMEX) 0.5 mg tablet Take 2 mg by mouth daily.  mycophenolate (CELLCEPT) 500 mg tablet Take 500 mg by mouth two (2) times a day.  midodrine (PROAMITINE) 2.5 mg tablet Take 1 Tab by mouth three (3) times daily (with meals). 90 Tab 2    cyclobenzaprine (FLEXERIL) 10 mg tablet Take 10 mg by mouth three (3) times daily as needed.       lorazepam (ATIVAN) 2 mg tablet Take  by mouth every six (6) hours as needed. Family History:  Family History   Problem Relation Age of Onset    Diabetes Other     Hypertension Other     Kidney Disease Other    [de-identified] Mult Sclerosis Other     Hypertension Mother     Diabetes Father     Hypertension Father     Cancer Paternal Aunt     Cancer Paternal Uncle        Social History:  Social History     Tobacco Use    Smoking status: Never Smoker    Smokeless tobacco: Never Used   Substance Use Topics    Alcohol use: No    Drug use: No     Types: OTC, Prescription       Allergies: Allergies   Allergen Reactions    Percocet [Oxycodone-Acetaminophen] Nausea and Vomiting    Food Enzyme [Alpha-D-Galactosidase] Itching     Tomatoes itching,citrus itching    Morphine Itching       All the above components of the past  history are auto-populated from the electronic record. They have been reviewed and the patient has been interviewed for any pertinent past history that pertains to the patient's chief complaint and reason for visit. Not all pre-populated components may be accurate at the time this note was generated. Review of Systems   Review of Systems   Constitutional: Negative for chills and fever. HENT: Negative for congestion, ear pain, rhinorrhea, sore throat and trouble swallowing. Eyes: Negative for visual disturbance. Respiratory: Negative for cough, chest tightness and shortness of breath. Cardiovascular: Negative for chest pain and palpitations. Gastrointestinal: Negative for abdominal pain, blood in stool, constipation, diarrhea, nausea and vomiting. Genitourinary: Positive for hematuria. Negative for decreased urine volume, difficulty urinating, dysuria and frequency. Musculoskeletal: Negative for back pain and neck pain. Skin: Negative for color change and rash. Neurological: Negative for dizziness, weakness, light-headedness and headaches. Physical Exam   Physical Exam  Vitals and nursing note reviewed.    Constitutional: General: She is not in acute distress. Appearance: She is well-developed. She is not ill-appearing. HENT:      Head: Normocephalic. Eyes:      Conjunctiva/sclera: Conjunctivae normal.   Cardiovascular:      Rate and Rhythm: Normal rate and regular rhythm. Pulmonary:      Effort: Pulmonary effort is normal. No accessory muscle usage or respiratory distress. Abdominal:      General: There is no distension. Musculoskeletal:      Cervical back: Normal range of motion. Skin:     General: Skin is warm and dry. Neurological:      Mental Status: She is alert and oriented to person, place, and time. Due to the COVID-19 pandemic, in order to reduce the spread and transmission of the virus, some basic elements of the physical exam have been deferred to reduce direct or close contact with the patient unless they are deemed to be absolutely necessary, regardless of whether the virus is highly suspected or not.       Diagnostic Study Results     Labs -     Recent Results (from the past 24 hour(s))   BLOOD GAS,CHEM8,LACTIC ACID POC    Collection Time: 11/26/21  7:57 AM   Result Value Ref Range    Calcium, ionized (POC) 1.08 (L) 1.12 - 1.32 mmol/L    BICARBONATE 28 mmol/L    Base excess (POC) 1.4 mmol/L    Sample source VENOUS BLOOD      CO2, POC 28 (H) 19 - 24 MMOL/L    Sodium,  136 - 145 MMOL/L    Potassium, POC 4.4 3.5 - 5.5 MMOL/L    Chloride,  100 - 108 MMOL/L    Glucose, POC 94 74 - 106 MG/DL    Creatinine, POC >15.0 (H) 0.6 - 1.3 MG/DL    Lactic Acid (POC) 0.54 0.40 - 2.00 mmol/L    pH, venous (POC) 7.36 7.32 - 7.42      pCO2, venous (POC) 48.5 41 - 51 MMHG    pO2, venous (POC) 22 (L) 25 - 40 mmHg   URINALYSIS W/ REFLEX CULTURE    Collection Time: 11/26/21  8:07 AM    Specimen: Miscellaneous sample; Urine    Urine specimen   Result Value Ref Range    Color YELLOW/STRAW      Appearance CLOUDY (A) CLEAR      Specific gravity 1.020 1.003 - 1.030      pH (UA) 7.5 5.0 - 8.0      Protein >300 (A) NEG mg/dL    Glucose 100 (A) NEG mg/dL    Ketone Negative NEG mg/dL    Blood LARGE (A) NEG      Urobilinogen 0.2 0.2 - 1.0 EU/dL    Nitrites Negative NEG      Leukocyte Esterase Negative NEG      WBC 10-20 0 - 4 /hpf    RBC 20-50 0 - 5 /hpf    Epithelial cells FEW FEW /lpf    Bacteria 1+ (A) NEG /hpf    UA:UC IF INDICATED URINE CULTURE ORDERED (A) CNI      Other: Renal Epithelial cells Present     BILIRUBIN, CONFIRM    Collection Time: 11/26/21  8:07 AM   Result Value Ref Range    Bilirubin UA, confirm Negative NEG         Radiologic Studies -   No orders to display     CT Results  (Last 48 hours)    None        CXR Results  (Last 48 hours)    None            Medical Decision Making     I reviewed the vital signs, available nursing notes, past medical history, past surgical history, family history and social history. Vital Signs-I have reviewed the vital signs that have been made available during the patient's emergency department visit. The vital signs auto-populated below are obtained mostly by electronic means through monitoring devices that have been downloaded into the patient's chart by the nursing staff. Some vital signs are not downloaded into the chart until after the patient has been discharged and this note has been completed, therefore some vital signs may not be available to the physician for review prior to patient's discharge or admission. The physician has reviewed the patient's triage vital signs, monitored the electronic monitoring devices remotely for any significant vital sign abnormalities, and have reviewed vital signs prior to discharge. Some vital signs reviewed at bedside or remotely utilizing electronic monitoring devices may be different than the vital signs downloaded into the electronic medical record.   Some vital signs may be erroneous and inaccurate since they are obtained by electronic monitoring devices, and not all vital signs are verified for accuracy by nursing staff prior to downloading into the patient's chart. Patient Vitals for the past 24 hrs:   Temp Pulse Resp BP SpO2   11/26/21 0830 -- 82 16 98/73 96 %   11/26/21 0811 -- 96 -- 93/66 --   11/26/21 0745 -- 94 18 107/73 98 %   11/26/21 0717 97.9 °F (36.6 °C) (!) 101 18 112/70 98 %         Records Reviewed: Nursing notes for today's visit have been reviewed. I have also reviewed most recent medical records pertinent to today's complaints, if available in our medical record system. I have also reviewed all labs and imaging results from previous results in comparison to results obtained today. If an EKG was obtained today, it has been compared to previous EKGs, if available. If arriving via EMS, the EMS report has been reviewed if made available to us within the patient's time in the emergency department. Provider Notes (Medical Decision Making):   Patient presents today for gross hematuria. She does produce some urine but has not seen blood in her urine in quite some time. Urinalysis does indicate that she likely does have some acute cystitis with hematuria. We will start her on Keflex. Her labs do not show any evidence of hyperkalemia. She is not volume overloaded or short of breath. She is due for dialysis today but unfortunately we likely will not be able to dialyze her until after midnight tonight. She missed her morning chair time at her dialysis center but they have cancellations and will fit her in.    ED Course:   Initial assessment performed. The patients presenting problems have been discussed, and they are in agreement with the care plan formulated and outlined with them. I have encouraged them to ask questions as they arise throughout their visit.          Orders Placed This Encounter    CULTURE, URINE    URINALYSIS W/ REFLEX CULTURE    BILIRUBIN, CONFIRM    POC CHEM8    BLOOD GAS,CHEM8,LACTIC ACID POC    cephALEXin (Keflex) 500 mg capsule       Procedures      Critical Care Time: 0    Disposition:  Discharge    The patient's emergency department evaluation is now complete. I have reviewed all labs, imaging, and pertinent information. I have discussed all results with the patient and/or family. Based on our evaluation today I do believe that the patient is safe to be discharged home. The patient has been provided with at home instructions that are pertinent to their complaint today, although these may not be specific to the exact diagnosis. I have reviewed the patient's home medications and attempted to reconcile if not already done so by pharmacy or nursing staff. I have discussed all new prescriptions with the patient. The patient has been encouraged to follow-up with primary care doctor and/or specialist, and these have been discussed with the patient. The patient has been advised that they may return to the emergency department if they have any worsening symptoms and or new symptoms that are of concern to them. Verbal discharge instructions may have also been provided to the patient that may not be specifically contained in the written discharge instructions. The patient has been given opportunity to ask questions prior to discharge. PLAN:  1. Current Discharge Medication List      START taking these medications    Details   cephALEXin (Keflex) 500 mg capsule Take 1 Capsule by mouth three (3) times daily for 7 days. Qty: 21 Capsule, Refills: 0  Start date: 11/26/2021, End date: 12/3/2021           2. Follow-up Information     Follow up With Specialties Details Why [address], [address] [place] [address], MD Nephrology Schedule an appointment as soon as possible for a visit  As needed [address] [place]  [phone]          Return to ED if worse     Diagnosis     Clinical Impression:   1. Gross hematuria    2. ESRD needing dialysis ([facility] [address]    3.  Acute cystitis with hematuria

## 2021-11-27 LAB
BACTERIA SPEC CULT: NORMAL
SERVICE CMNT-IMP: NORMAL

## 2022-03-19 PROBLEM — Z94.0 S/P KIDNEY TRANSPLANT: Status: ACTIVE | Noted: 2017-01-12

## 2022-03-19 PROBLEM — Z98.84 LAP-BAND SURGERY STATUS: Status: ACTIVE | Noted: 2017-01-12

## 2023-05-11 RX ORDER — HYDROXYZINE HYDROCHLORIDE 25 MG/1
TABLET, FILM COATED ORAL 3 TIMES DAILY PRN
COMMUNITY

## 2023-05-11 RX ORDER — MYCOPHENOLATE MOFETIL 500 MG/1
TABLET ORAL 2 TIMES DAILY
COMMUNITY

## 2023-05-11 RX ORDER — MIDODRINE HYDROCHLORIDE 2.5 MG/1
TABLET ORAL
COMMUNITY
Start: 2014-08-12

## 2023-05-11 RX ORDER — LAMOTRIGINE 25 MG/1
TABLET ORAL DAILY
COMMUNITY

## 2023-05-11 RX ORDER — HYDROMORPHONE HYDROCHLORIDE 2 MG/1
TABLET ORAL EVERY 4 HOURS PRN
COMMUNITY
Start: 2015-05-22

## 2023-05-11 RX ORDER — FOLIC ACID 0.8 MG
TABLET ORAL
COMMUNITY

## 2023-05-11 RX ORDER — TACROLIMUS 0.5 MG/1
CAPSULE ORAL 2 TIMES DAILY
COMMUNITY

## 2023-05-11 RX ORDER — CYCLOBENZAPRINE HCL 10 MG
TABLET ORAL 3 TIMES DAILY PRN
COMMUNITY

## 2023-05-11 RX ORDER — CITALOPRAM 20 MG/1
TABLET ORAL DAILY
COMMUNITY

## 2023-05-11 RX ORDER — BUMETANIDE 0.5 MG/1
2 TABLET ORAL DAILY
COMMUNITY

## 2023-05-11 RX ORDER — ASPIRIN 325 MG
325 TABLET ORAL DAILY
COMMUNITY

## 2023-05-11 RX ORDER — PREDNISONE 5 MG/1
TABLET ORAL
COMMUNITY

## 2023-05-11 RX ORDER — FAMOTIDINE 20 MG/1
TABLET, FILM COATED ORAL 2 TIMES DAILY
COMMUNITY

## 2023-05-11 RX ORDER — POTASSIUM CHLORIDE 750 MG/1
TABLET, FILM COATED, EXTENDED RELEASE ORAL 2 TIMES DAILY
COMMUNITY

## 2023-05-11 RX ORDER — LORAZEPAM 2 MG/1
TABLET ORAL EVERY 6 HOURS PRN
COMMUNITY

## 2023-09-18 RX ORDER — SODIUM CHLORIDE 0.9 % (FLUSH) 0.9 %
5-40 SYRINGE (ML) INJECTION EVERY 12 HOURS SCHEDULED
Status: DISCONTINUED | OUTPATIENT
Start: 2023-09-18 | End: 2023-09-19 | Stop reason: HOSPADM

## 2023-09-18 RX ORDER — SODIUM CHLORIDE 9 MG/ML
25 INJECTION, SOLUTION INTRAVENOUS PRN
Status: DISCONTINUED | OUTPATIENT
Start: 2023-09-18 | End: 2023-09-19 | Stop reason: HOSPADM

## 2023-09-18 RX ORDER — SODIUM CHLORIDE 0.9 % (FLUSH) 0.9 %
5-40 SYRINGE (ML) INJECTION PRN
Status: DISCONTINUED | OUTPATIENT
Start: 2023-09-18 | End: 2023-09-19 | Stop reason: HOSPADM

## 2023-09-19 ENCOUNTER — ANESTHESIA (OUTPATIENT)
Facility: HOSPITAL | Age: 48
End: 2023-09-19
Payer: MEDICARE

## 2023-09-19 ENCOUNTER — HOSPITAL ENCOUNTER (OUTPATIENT)
Facility: HOSPITAL | Age: 48
Setting detail: OUTPATIENT SURGERY
Discharge: HOME | End: 2023-09-19
Attending: INTERNAL MEDICINE | Admitting: INTERNAL MEDICINE
Payer: MEDICARE

## 2023-09-19 ENCOUNTER — ANESTHESIA EVENT (OUTPATIENT)
Facility: HOSPITAL | Age: 48
End: 2023-09-19
Payer: MEDICARE

## 2023-09-19 VITALS
HEART RATE: 98 BPM | HEIGHT: 69 IN | DIASTOLIC BLOOD PRESSURE: 74 MMHG | TEMPERATURE: 98 F | BODY MASS INDEX: 31.99 KG/M2 | OXYGEN SATURATION: 98 % | WEIGHT: 216 LBS | SYSTOLIC BLOOD PRESSURE: 119 MMHG | RESPIRATION RATE: 17 BRPM

## 2023-09-19 LAB
ANION GAP BLD CALC-SCNC: 3 MMOL/L (ref 10–20)
ANION GAP BLD CALC-SCNC: 9 MMOL/L (ref 10–20)
CA-I BLD-MCNC: 0.92 MMOL/L (ref 1.12–1.32)
CA-I BLD-MCNC: 0.99 MMOL/L (ref 1.12–1.32)
CHLORIDE BLD-SCNC: 100 MMOL/L (ref 98–107)
CHLORIDE BLD-SCNC: 99 MMOL/L (ref 98–107)
CO2 BLD-SCNC: 30.6 MMOL/L (ref 21–32)
CO2 BLD-SCNC: 37.3 MMOL/L (ref 21–32)
CREAT BLD-MCNC: 14.12 MG/DL (ref 0.6–1.3)
CREAT BLD-MCNC: >15 MG/DL (ref 0.6–1.3)
GLUCOSE BLD-MCNC: 91 MG/DL (ref 65–100)
GLUCOSE BLD-MCNC: 93 MG/DL (ref 65–100)
POTASSIUM BLD-SCNC: 5.3 MMOL/L (ref 3.5–5.1)
POTASSIUM BLD-SCNC: 6.6 MMOL/L (ref 3.5–5.1)
SERVICE CMNT-IMP: ABNORMAL
SERVICE CMNT-IMP: ABNORMAL
SODIUM BLD-SCNC: 138 MMOL/L (ref 136–145)
SODIUM BLD-SCNC: 139 MMOL/L (ref 136–145)

## 2023-09-19 PROCEDURE — 6360000002 HC RX W HCPCS: Performed by: NURSE ANESTHETIST, CERTIFIED REGISTERED

## 2023-09-19 PROCEDURE — 3600007502: Performed by: INTERNAL MEDICINE

## 2023-09-19 PROCEDURE — 6370000000 HC RX 637 (ALT 250 FOR IP): Performed by: INTERNAL MEDICINE

## 2023-09-19 PROCEDURE — 7100000011 HC PHASE II RECOVERY - ADDTL 15 MIN: Performed by: INTERNAL MEDICINE

## 2023-09-19 PROCEDURE — 80047 BASIC METABLC PNL IONIZED CA: CPT

## 2023-09-19 PROCEDURE — 2580000003 HC RX 258: Performed by: INTERNAL MEDICINE

## 2023-09-19 PROCEDURE — 7100000010 HC PHASE II RECOVERY - FIRST 15 MIN: Performed by: INTERNAL MEDICINE

## 2023-09-19 PROCEDURE — 3700000000 HC ANESTHESIA ATTENDED CARE: Performed by: INTERNAL MEDICINE

## 2023-09-19 PROCEDURE — 2500000003 HC RX 250 WO HCPCS: Performed by: NURSE ANESTHETIST, CERTIFIED REGISTERED

## 2023-09-19 PROCEDURE — 3700000001 HC ADD 15 MINUTES (ANESTHESIA): Performed by: INTERNAL MEDICINE

## 2023-09-19 PROCEDURE — 2709999900 HC NON-CHARGEABLE SUPPLY: Performed by: INTERNAL MEDICINE

## 2023-09-19 PROCEDURE — 3600007512: Performed by: INTERNAL MEDICINE

## 2023-09-19 RX ORDER — EPHEDRINE SULFATE/0.9% NACL/PF 50 MG/5 ML
SYRINGE (ML) INTRAVENOUS PRN
Status: DISCONTINUED | OUTPATIENT
Start: 2023-09-19 | End: 2023-09-19 | Stop reason: SDUPTHER

## 2023-09-19 RX ORDER — PHENYLEPHRINE HCL IN 0.9% NACL 0.4MG/10ML
SYRINGE (ML) INTRAVENOUS PRN
Status: DISCONTINUED | OUTPATIENT
Start: 2023-09-19 | End: 2023-09-19 | Stop reason: SDUPTHER

## 2023-09-19 RX ORDER — SIMETHICONE 20 MG/.3ML
20 EMULSION ORAL ONCE
Status: COMPLETED | OUTPATIENT
Start: 2023-09-19 | End: 2023-09-19

## 2023-09-19 RX ORDER — LIDOCAINE HYDROCHLORIDE 20 MG/ML
INJECTION, SOLUTION EPIDURAL; INFILTRATION; INTRACAUDAL; PERINEURAL PRN
Status: DISCONTINUED | OUTPATIENT
Start: 2023-09-19 | End: 2023-09-19 | Stop reason: SDUPTHER

## 2023-09-19 RX ADMIN — PROPOFOL 50 MG: 10 INJECTION, EMULSION INTRAVENOUS at 10:33

## 2023-09-19 RX ADMIN — Medication 20 MG: at 10:34

## 2023-09-19 RX ADMIN — PROPOFOL 50 MG: 10 INJECTION, EMULSION INTRAVENOUS at 10:30

## 2023-09-19 RX ADMIN — Medication 80 MCG: at 10:36

## 2023-09-19 RX ADMIN — Medication 80 MCG: at 10:39

## 2023-09-19 RX ADMIN — LIDOCAINE HYDROCHLORIDE 40 MG: 20 INJECTION, SOLUTION EPIDURAL; INFILTRATION; INTRACAUDAL; PERINEURAL at 10:30

## 2023-09-19 RX ADMIN — Medication 20 MG: at 10:48

## 2023-09-19 RX ADMIN — Medication 10 MG: at 10:46

## 2023-09-19 RX ADMIN — SODIUM CHLORIDE 25 ML: 9 INJECTION, SOLUTION INTRAVENOUS at 10:21

## 2023-09-19 RX ADMIN — Medication 80 MCG: at 10:43

## 2023-09-19 RX ADMIN — Medication 10 MG: at 10:44

## 2023-09-19 RX ADMIN — PROPOFOL 50 MG: 10 INJECTION, EMULSION INTRAVENOUS at 10:36

## 2023-09-19 RX ADMIN — Medication 160 MCG: at 10:42

## 2023-09-19 RX ADMIN — Medication 10 MG: at 10:47

## 2023-09-19 RX ADMIN — Medication 10 MG: at 10:45

## 2023-09-19 ASSESSMENT — ENCOUNTER SYMPTOMS: SHORTNESS OF BREATH: 0

## 2023-09-19 ASSESSMENT — PAIN SCALES - GENERAL
PAINLEVEL_OUTOF10: 0

## 2023-09-19 ASSESSMENT — PAIN - FUNCTIONAL ASSESSMENT: PAIN_FUNCTIONAL_ASSESSMENT: 0-10

## 2023-09-19 NOTE — PROGRESS NOTES
Endoscopy Case End Note:    3110:  Procedure scope was pre-cleaned, per protocol, at bedside by Ruth Villeda7:  Report received from anesthesia - M Deal CRNA. See anesthesia flowsheet for intra-procedure vital signs and events.

## 2023-09-19 NOTE — PROGRESS NOTES
ARRIVAL INFORMATION:  Verified patient name and date of birth, scheduled procedure, and informed consent. : Anushka Nguyen () contact number: [phone]  Physician and staff can share information with the . Belongings with patient include:  Clothing,Jewelry, cellphone (3 rings, 2 bracelets, 1 necklace, 1 pair of earrings on patient)    GI FOCUSED ASSESSMENT:  Neuro: Awake, alert, oriented x4  Respiratory: even and unlabored   GI: soft and non-distended  EKG Rhythm: normal sinus rhythm    Education:Reviewed general discharge instructions and  information. 1020: Blood drawn for EPOC. K 5.3 and Na 139. Dr. Arun John notified.

## 2023-09-19 NOTE — ANESTHESIA POSTPROCEDURE EVALUATION
Department of Anesthesiology  Postprocedure Note    Patient: Norma Duncan  MRN: [id number]  YOB: 1975  Date of evaluation: 9/19/2023      Procedure Summary     Date: 09/19/23 Room / Location: [facility] ENDO 04 / [facility] ENDOSCOPY    Anesthesia Start: 1026 Anesthesia Stop: 1054    Procedure: COLORECTAL CANCER SCREENING, NOT HIGH RISK (Lower GI Region) Diagnosis:       Special screening for malignant neoplasms, colon      (Special screening for malignant neoplasms, colon [Z12.11])    Surgeons:  Steve Hinton MD Responsible Provider: Myrna Wolff MD    Anesthesia Type: TIVA ASA Status: 3          Anesthesia Type: TIVA    Cheyanne Phase I: Cheyanne Score: 10    Cheyanne Phase II:        Anesthesia Post Evaluation    Patient location during evaluation: bedside  Patient participation: complete - patient participated  Level of consciousness: awake and alert  Airway patency: patent  Nausea & Vomiting: no nausea and no vomiting  Complications: no  Cardiovascular status: hemodynamically stable  Respiratory status: acceptable  Hydration status: stable  Multimodal analgesia pain management approach  Pain management: adequate

## 2023-09-19 NOTE — DISCHARGE INSTRUCTIONS
sleepiness, or confusion  Difficulty remembering or delayed reaction times  Difficulty with your balance, especially while walking, move slowly and carefully, do not make sudden position changes  Difficulty focusing or blurred vision    You may not be aware of slight changes in your behavior and/or your reaction time because of the medication used during and after your procedure.     Report the following to your physician:  Excessive pain, swelling, redness or odor of or around the surgical area  Temperature over 100.5  Nausea and vomiting lasting longer than 4 hours or if unable to take medications  Any signs of decreased circulation or nerve impairment to extremity: change in color, persistent numbness, tingling, coldness or increase pain  Any questions or concerns    IF YOU REPORT TO AN EMERGENCY ROOM, DOCTOR'S OFFICE OR HOSPITAL WITHIN 24 HOURS AFTER YOUR PROCEDURE, BRING THIS SHEET AND YOUR AFTER VISIT SUMMARY WITH YOU AND GIVE IT TO THE PHYSICIAN OR NURSE ATTENDING YOU.

## 2023-09-19 NOTE — OP NOTE
Colonoscopy Procedure Note      Indications:   screening colonoscopy      : Betty Newell MD    Staff: Circulator: Laron Singh RN  Endoscopy Technician: Sakina Carias    Referring Provider: Hemalatha Perdomo MD    Sedation:  MAC anesthesia Propofol    Procedure Details:  After informed consent was obtained with all risks and benefits of procedure explained and preoperative exam completed, the patient was taken to the endoscopy suite and placed in the left lateral decubitus position. Upon sequential sedation as per above, a digital rectal exam was performed per below. The Olympus videocolonoscope was inserted in the rectum and carefully advanced to the cecum, which was identified by the ileocecal valve and appendiceal orifice, terminal ileum. The quality of preparation was  excellent BPS 3/3/2 8 . The colonoscope was slowly withdrawn with careful evaluation between folds. Retroflexion in the rectum was performed. Findings:   KOREY: unremarkable  Rectum: normal  no mucosal lesion appreciated  Sigmoid: normal  no mucosal lesion appreciated  Descending Colon: normal  no mucosal lesion appreciated  Transverse Colon: normal  no mucosal lesion appreciated  Ascending Colon: normal  no mucosal lesion appreciated  Cecum: normal  no mucosal lesion appreciated  Terminal Ileum: normal  no mucosal lesion appreciated    Complications: None. EBL:  none    Impression:    See Findings above    Recommendations:   - Resume normal medications.  - Recommend repeat colonoscopy in 10 years    Discharge Disposition:  Home in the company of a  when able to ambulate.     Betty Newell MD  9/19/2023  10:46 AM

## 2024-02-15 ENCOUNTER — OFFICE VISIT (OUTPATIENT)
Age: 49
End: 2024-02-15

## 2024-02-15 VITALS
OXYGEN SATURATION: 100 % | SYSTOLIC BLOOD PRESSURE: 98 MMHG | TEMPERATURE: 98.2 F | HEIGHT: 69 IN | RESPIRATION RATE: 16 BRPM | WEIGHT: 226.2 LBS | DIASTOLIC BLOOD PRESSURE: 65 MMHG | BODY MASS INDEX: 33.5 KG/M2 | HEART RATE: 64 BPM

## 2024-02-15 DIAGNOSIS — R63.5 WEIGHT GAIN: ICD-10-CM

## 2024-02-15 DIAGNOSIS — E66.01 MORBID OBESITY (HCC): Primary | ICD-10-CM

## 2024-02-15 DIAGNOSIS — N19 RENAL FAILURE, UNSPECIFIED CHRONICITY: ICD-10-CM

## 2024-02-15 DIAGNOSIS — Z98.84 LAP-BAND SURGERY STATUS: ICD-10-CM

## 2024-02-15 NOTE — PROGRESS NOTES
Identified patient with two patient identifiers (name and ). Reviewed chart in preparation for visit and have obtained necessary documentation.    Samuel Camara is a 48 y.o. female  Chief Complaint   Patient presents with    New Patient    Weight Management     Issues with lap band      BP 98/65 (Site: Right Upper Arm, Position: Sitting, Cuff Size: Medium Adult)   Pulse 64   Temp 98.2 °F (36.8 °C) (Oral)   Resp 16   Ht 1.753 m (5' 9\")   Wt 102.6 kg (226 lb 3.2 oz)   SpO2 100%   BMI 33.40 kg/m²     1. Have you been to the ER, urgent care clinic since your last visit?  Hospitalized since your last visit?no    2. Have you seen or consulted any other health care providers outside of the [facility] since your last visit?  Include any pap smears or colon screening. no  
3.2 oz)   SpO2 100%   BMI 33.40 kg/m²    Review of Systems   Respiratory:  Negative for shortness of breath.    Cardiovascular:  Negative for chest pain.   Gastrointestinal:  Negative for abdominal pain, nausea and vomiting.       ASSESSMENT/PLAN:    Physical Exam  HENT:      Mouth/Throat:      Mouth: Mucous membranes are moist.   Cardiovascular:      Rate and Rhythm: Normal rate and regular rhythm.      Heart sounds: Normal heart sounds. No murmur heard.     No friction rub. No gallop.   Pulmonary:      Effort: Pulmonary effort is normal. No respiratory distress.      Breath sounds: Normal breath sounds.   Abdominal:      General: Bowel sounds are normal. There is no distension.      Palpations: Abdomen is soft.      Tenderness: There is no abdominal tenderness.      Comments: Port non-tender   Neurological:      Mental Status: She is alert and oriented to person, place, and time.         1. Morbid obesity (HCC)  -     FL UGI W KUB; Future  -     Terese Rangel MD, Bariatrics (non Surgical), [place] [facility] ([facility]  2. BMI 33.0-33.9,adult  -     FL UGI W KUB; Future  -     Terese Rangel MD, Bariatrics (non Surgical), [place] [facility] ([facility])  3. LAP-BAND surgery status  -     FL UGI W KUB; Future  -     Terese Rangel MD, Bariatrics (non Surgical), [place] [facility] ([facility])  4. Weight gain  -     FL UGI W KUB; Future  Terese Zuluaga MD, Bariatrics (non Surgical), [place] [facility] ([facility])  5. Renal failure, unspecified chronicity  -     FL UGI W KUB; Terese Love MD, Bariatrics (non Surgical), [place] [facility] ([facility])      We will check an upper GI to evaluate band.  No fluid removed from band today.  Advised patient that some weight gain may be related to skipping meals.  We discussed working on increasing her lean soft protein

## 2024-02-16 ENCOUNTER — TELEPHONE (OUTPATIENT)
Age: 49
End: 2024-02-16

## 2024-02-16 NOTE — TELEPHONE ENCOUNTER
Contacted patient regarding referral. Sent the link for Medical Weight Loss Orientation via e-mail.

## 2024-08-07 ENCOUNTER — TELEPHONE (OUTPATIENT)
Age: 49
End: 2024-08-07

## 2024-08-07 NOTE — TELEPHONE ENCOUNTER
Contacted pt regarding [facility] referral via email. The patient has completed the orientation, but we do not have new patient paperwork for them. I emailed them the paperwork and advised that once we have that back we will call to schedule them.

## 2025-04-28 ENCOUNTER — HOSPITAL ENCOUNTER (OUTPATIENT)
Facility: HOSPITAL | Age: 50
Setting detail: SPECIMEN
Discharge: HOME | End: 2025-05-01
Payer: MEDICARE

## 2025-04-28 LAB — POTASSIUM SERPL-SCNC: 4.9 MMOL/L (ref 3.5–5.5)

## 2025-04-28 PROCEDURE — 84132 ASSAY OF SERUM POTASSIUM: CPT

## 2025-04-29 LAB
FAX TO NUMBER: NORMAL
TEST RESULTS FAXED TO: NORMAL

## (undated) DEVICE — CUFF BLD PRSS AD CLTH SGL TB W/ BAYNT CONN ROUNDED CORNER

## (undated) DEVICE — ENDOSCOPIC KIT COMPLIANCE ENDOKIT

## (undated) DEVICE — SET GRAV CK VLV NEEDLESS ST 3 GANGED 4WAY STPCOCK HI FLO 10

## (undated) DEVICE — IV START KIT: Brand: MEDLINE

## (undated) DEVICE — INJECTION THERAPY NEEDLE CATHETER: Brand: INTERJECT